# Patient Record
Sex: MALE | Race: WHITE | NOT HISPANIC OR LATINO | Employment: FULL TIME | URBAN - METROPOLITAN AREA
[De-identification: names, ages, dates, MRNs, and addresses within clinical notes are randomized per-mention and may not be internally consistent; named-entity substitution may affect disease eponyms.]

---

## 2020-09-08 DIAGNOSIS — K21.9 GASTROESOPHAGEAL REFLUX DISEASE WITHOUT ESOPHAGITIS: Primary | ICD-10-CM

## 2020-09-08 RX ORDER — OMEPRAZOLE 40 MG/1
40 CAPSULE, DELAYED RELEASE ORAL DAILY
Qty: 90 CAPSULE | Refills: 2 | OUTPATIENT
Start: 2020-09-08 | End: 2020-12-07

## 2020-09-11 ENCOUNTER — OFFICE VISIT (OUTPATIENT)
Dept: INTERNAL MEDICINE CLINIC | Facility: CLINIC | Age: 55
End: 2020-09-11
Payer: COMMERCIAL

## 2020-09-11 VITALS
HEART RATE: 95 BPM | BODY MASS INDEX: 26.34 KG/M2 | DIASTOLIC BLOOD PRESSURE: 86 MMHG | SYSTOLIC BLOOD PRESSURE: 138 MMHG | WEIGHT: 184 LBS | TEMPERATURE: 97.4 F | HEIGHT: 70 IN

## 2020-09-11 DIAGNOSIS — E78.1 HYPERTRIGLYCERIDEMIA: ICD-10-CM

## 2020-09-11 DIAGNOSIS — K21.9 GASTROESOPHAGEAL REFLUX DISEASE WITHOUT ESOPHAGITIS: ICD-10-CM

## 2020-09-11 DIAGNOSIS — C14.0 THROAT CANCER (HCC): ICD-10-CM

## 2020-09-11 DIAGNOSIS — R73.01 IMPAIRED FASTING GLUCOSE: Primary | ICD-10-CM

## 2020-09-11 PROCEDURE — 3725F SCREEN DEPRESSION PERFORMED: CPT | Performed by: INTERNAL MEDICINE

## 2020-09-11 PROCEDURE — 99214 OFFICE O/P EST MOD 30 MIN: CPT | Performed by: INTERNAL MEDICINE

## 2020-09-11 RX ORDER — OMEPRAZOLE 40 MG/1
40 CAPSULE, DELAYED RELEASE ORAL DAILY
Qty: 90 CAPSULE | Refills: 1 | Status: SHIPPED | OUTPATIENT
Start: 2020-09-11 | End: 2021-01-26 | Stop reason: SDUPTHER

## 2020-09-11 RX ORDER — OMEPRAZOLE 40 MG/1
40 CAPSULE, DELAYED RELEASE ORAL DAILY
COMMUNITY
End: 2020-09-11 | Stop reason: SDUPTHER

## 2020-09-11 NOTE — PROGRESS NOTES
Assessment/Plan:    BMI Counseling: Body mass index is 26 4 kg/m²  The BMI is above normal  Nutrition recommendations include decreasing portion sizes, encouraging healthy choices of fruits and vegetables and decreasing fast food intake  Exercise recommendations include moderate physical activity 150 minutes/week  Tobacco Cessation Counseling: Tobacco cessation counseling was provided  The patient is sincerely urged to quit consumption of tobacco  He is ready to quit tobacco            1  Impaired fasting glucose  -     CBC and differential; Future  -     Comprehensive metabolic panel; Future  -     TSH, 3rd generation; Future  -     Lipid Panel with Direct LDL reflex; Future  -     HEMOGLOBIN A1C W/ EAG ESTIMATION; Future    2  Gastroesophageal reflux disease without esophagitis  -     omeprazole (PriLOSEC) 40 MG capsule; Take 1 capsule (40 mg total) by mouth daily    3  Throat cancer ([facility] )    4  Hypertriglyceridemia  -     Comprehensive metabolic panel; Future  -     TSH, 3rd generation; Future  -     Lipid Panel with Direct LDL reflex; Future           Subjective:      Patient ID: Norine Nissen is a 60 y o  male  Follow-up multiple medical issue to ensure the stable      The following portions of the patient's history were reviewed and updated as appropriate: He  has a past medical history of GERD (gastroesophageal reflux disease), Hepatitis C, Hypertriglyceridemia, Impaired fasting glucose, and Throat cancer ([facility] )  He   Patient Active Problem List    Diagnosis Date Noted    Throat cancer ([facility] )     Impaired fasting glucose     Hypertriglyceridemia     GERD (gastroesophageal reflux disease)     Hepatitis C      He  has a past surgical history that includes Knee surgery and Replacement total knee (Right)  His family history includes Heart disease in his father  He  reports that he has been smoking cigarettes  He has a 17 50 pack-year smoking history   He has never used smokeless tobacco  He reports current alcohol use of about 1 0 standard drinks of alcohol per week  He reports that he does not use drugs  Current Outpatient Medications   Medication Sig Dispense Refill    omeprazole (PriLOSEC) 40 MG capsule Take 1 capsule (40 mg total) by mouth daily 90 capsule 1     No current facility-administered medications for this visit  Current Outpatient Medications on File Prior to Visit   Medication Sig    [DISCONTINUED] omeprazole (PriLOSEC) 40 MG capsule Take 40 mg by mouth daily     No current facility-administered medications on file prior to visit  He is allergic to penicillins       Review of Systems   Constitutional: Negative for chills and fever  HENT: Negative for congestion, ear pain and sore throat  Eyes: Negative for pain  Respiratory: Negative for cough and shortness of breath  Cardiovascular: Negative for chest pain and leg swelling  Gastrointestinal: Negative for abdominal pain, nausea and vomiting  Endocrine: Negative for polyuria  Genitourinary: Negative for difficulty urinating, frequency and urgency  Musculoskeletal: Negative for arthralgias and back pain  Skin: Negative for rash  Neurological: Negative for weakness and headaches  Psychiatric/Behavioral: Negative for sleep disturbance  The patient is not nervous/anxious  Objective:      /86 (BP Location: Right arm, Patient Position: Sitting)   Pulse 95   Temp (!) 97 4 °F (36 3 °C) (Skin)   Ht 5' 10" (1 778 m)   Wt 83 5 kg (184 lb)   BMI 26 40 kg/m²     No results found for this or any previous visit (from the past 1344 hour(s))  Physical Exam  Constitutional:       Appearance: Normal appearance  HENT:      Head: Normocephalic  Right Ear: Tympanic membrane, ear canal and external ear normal       Left Ear: Tympanic membrane, ear canal and external ear normal       Nose: Nose normal  No congestion        Mouth/Throat:      Mouth: Mucous membranes are moist       Pharynx: Oropharynx is clear  No oropharyngeal exudate or posterior oropharyngeal erythema  Eyes:      Extraocular Movements: Extraocular movements intact  Conjunctiva/sclera: Conjunctivae normal       Pupils: Pupils are equal, round, and reactive to light  Neck:      Musculoskeletal: Normal range of motion and neck supple  Cardiovascular:      Rate and Rhythm: Normal rate and regular rhythm  Heart sounds: Normal heart sounds  No murmur  Pulmonary:      Effort: Pulmonary effort is normal       Breath sounds: Normal breath sounds  No wheezing or rales  Abdominal:      General: Bowel sounds are normal  There is no distension  Palpations: Abdomen is soft  Tenderness: There is no abdominal tenderness  Musculoskeletal: Normal range of motion  Right lower leg: No edema  Left lower leg: No edema  Lymphadenopathy:      Cervical: No cervical adenopathy  Skin:     General: Skin is warm  Neurological:      General: No focal deficit present  Mental Status: He is alert and oriented to person, place, and time

## 2020-09-16 ENCOUNTER — APPOINTMENT (OUTPATIENT)
Dept: LAB | Facility: CLINIC | Age: 55
End: 2020-09-16
Payer: COMMERCIAL

## 2020-09-16 DIAGNOSIS — E78.1 HYPERTRIGLYCERIDEMIA: ICD-10-CM

## 2020-09-16 DIAGNOSIS — R73.01 IMPAIRED FASTING GLUCOSE: ICD-10-CM

## 2020-09-16 LAB
ALBUMIN SERPL BCP-MCNC: 4.1 G/DL (ref 3.5–5)
ALP SERPL-CCNC: 73 U/L (ref 46–116)
ALT SERPL W P-5'-P-CCNC: 34 U/L (ref 12–78)
ANION GAP SERPL CALCULATED.3IONS-SCNC: 3 MMOL/L (ref 4–13)
AST SERPL W P-5'-P-CCNC: 29 U/L (ref 5–45)
BASOPHILS # BLD AUTO: 0.06 THOUSANDS/ΜL (ref 0–0.1)
BASOPHILS NFR BLD AUTO: 1 % (ref 0–1)
BILIRUB SERPL-MCNC: 0.56 MG/DL (ref 0.2–1)
BUN SERPL-MCNC: 18 MG/DL (ref 5–25)
CALCIUM SERPL-MCNC: 9.5 MG/DL (ref 8.3–10.1)
CHLORIDE SERPL-SCNC: 106 MMOL/L (ref 100–108)
CHOLEST SERPL-MCNC: 212 MG/DL (ref 50–200)
CO2 SERPL-SCNC: 30 MMOL/L (ref 21–32)
CREAT SERPL-MCNC: 0.95 MG/DL (ref 0.6–1.3)
EOSINOPHIL # BLD AUTO: 0.14 THOUSAND/ΜL (ref 0–0.61)
EOSINOPHIL NFR BLD AUTO: 2 % (ref 0–6)
ERYTHROCYTE [DISTWIDTH] IN BLOOD BY AUTOMATED COUNT: 11.9 % (ref 11.6–15.1)
EST. AVERAGE GLUCOSE BLD GHB EST-MCNC: 126 MG/DL
GFR SERPL CREATININE-BSD FRML MDRD: 88 ML/MIN/1.73SQ M
GLUCOSE P FAST SERPL-MCNC: 117 MG/DL (ref 65–99)
HBA1C MFR BLD: 6 %
HCT VFR BLD AUTO: 45 % (ref 36.5–49.3)
HDLC SERPL-MCNC: 33 MG/DL
HGB BLD-MCNC: 16 G/DL (ref 12–17)
IMM GRANULOCYTES # BLD AUTO: 0.01 THOUSAND/UL (ref 0–0.2)
IMM GRANULOCYTES NFR BLD AUTO: 0 % (ref 0–2)
LDLC SERPL CALC-MCNC: 132 MG/DL (ref 0–100)
LYMPHOCYTES # BLD AUTO: 1.58 THOUSANDS/ΜL (ref 0.6–4.47)
LYMPHOCYTES NFR BLD AUTO: 23 % (ref 14–44)
MCH RBC QN AUTO: 33.1 PG (ref 26.8–34.3)
MCHC RBC AUTO-ENTMCNC: 35.6 G/DL (ref 31.4–37.4)
MCV RBC AUTO: 93 FL (ref 82–98)
MONOCYTES # BLD AUTO: 0.73 THOUSAND/ΜL (ref 0.17–1.22)
MONOCYTES NFR BLD AUTO: 11 % (ref 4–12)
NEUTROPHILS # BLD AUTO: 4.42 THOUSANDS/ΜL (ref 1.85–7.62)
NEUTS SEG NFR BLD AUTO: 63 % (ref 43–75)
NRBC BLD AUTO-RTO: 0 /100 WBCS
PLATELET # BLD AUTO: 150 THOUSANDS/UL (ref 149–390)
PMV BLD AUTO: 11.8 FL (ref 8.9–12.7)
POTASSIUM SERPL-SCNC: 3.6 MMOL/L (ref 3.5–5.3)
PROT SERPL-MCNC: 7.1 G/DL (ref 6.4–8.2)
RBC # BLD AUTO: 4.83 MILLION/UL (ref 3.88–5.62)
SODIUM SERPL-SCNC: 139 MMOL/L (ref 136–145)
TRIGL SERPL-MCNC: 234 MG/DL
TSH SERPL DL<=0.05 MIU/L-ACNC: 12.7 UIU/ML (ref 0.36–3.74)
WBC # BLD AUTO: 6.94 THOUSAND/UL (ref 4.31–10.16)

## 2020-09-16 PROCEDURE — 85025 COMPLETE CBC W/AUTO DIFF WBC: CPT

## 2020-09-16 PROCEDURE — 84443 ASSAY THYROID STIM HORMONE: CPT

## 2020-09-16 PROCEDURE — 80053 COMPREHEN METABOLIC PANEL: CPT

## 2020-09-16 PROCEDURE — 83036 HEMOGLOBIN GLYCOSYLATED A1C: CPT

## 2020-09-16 PROCEDURE — 80061 LIPID PANEL: CPT

## 2020-09-16 PROCEDURE — 36415 COLL VENOUS BLD VENIPUNCTURE: CPT

## 2020-09-21 ENCOUNTER — OFFICE VISIT (OUTPATIENT)
Dept: INTERNAL MEDICINE CLINIC | Facility: CLINIC | Age: 55
End: 2020-09-21
Payer: COMMERCIAL

## 2020-09-21 VITALS
HEIGHT: 70 IN | HEART RATE: 62 BPM | TEMPERATURE: 97.3 F | BODY MASS INDEX: 26.48 KG/M2 | SYSTOLIC BLOOD PRESSURE: 133 MMHG | WEIGHT: 185 LBS | DIASTOLIC BLOOD PRESSURE: 86 MMHG

## 2020-09-21 DIAGNOSIS — R73.01 IMPAIRED FASTING GLUCOSE: ICD-10-CM

## 2020-09-21 DIAGNOSIS — C14.0 THROAT CANCER (HCC): ICD-10-CM

## 2020-09-21 DIAGNOSIS — E03.9 ACQUIRED HYPOTHYROIDISM: Primary | ICD-10-CM

## 2020-09-21 DIAGNOSIS — B18.2 CHRONIC HEPATITIS C WITHOUT HEPATIC COMA (HCC): ICD-10-CM

## 2020-09-21 DIAGNOSIS — E78.1 HYPERTRIGLYCERIDEMIA: ICD-10-CM

## 2020-09-21 DIAGNOSIS — K21.9 GASTROESOPHAGEAL REFLUX DISEASE WITHOUT ESOPHAGITIS: ICD-10-CM

## 2020-09-21 PROCEDURE — 99214 OFFICE O/P EST MOD 30 MIN: CPT | Performed by: INTERNAL MEDICINE

## 2020-09-21 PROCEDURE — 4004F PT TOBACCO SCREEN RCVD TLK: CPT | Performed by: INTERNAL MEDICINE

## 2020-09-21 RX ORDER — LEVOTHYROXINE SODIUM 0.05 MG/1
50 TABLET ORAL
Qty: 90 TABLET | Refills: 0 | Status: SHIPPED | OUTPATIENT
Start: 2020-09-21 | End: 2021-01-26

## 2020-09-21 NOTE — PROGRESS NOTES
Assessment/Plan:      Tobacco Cessation Counseling: Tobacco cessation counseling was provided  The patient is sincerely urged to quit consumption of tobacco  He is ready to quit tobacco            1  Acquired hypothyroidism  -     levothyroxine 50 mcg tablet; Take 1 tablet (50 mcg total) by mouth daily in the early morning  -     TSH, 3rd generation; Future    2  Impaired fasting glucose    3  Throat cancer ([facility] )    4  Hypertriglyceridemia    5  Gastroesophageal reflux disease without esophagitis    6  Chronic hepatitis C without hepatic coma (HCC)           Subjective:      Patient ID: Earla Cogan is a 60 y o  male  Follow-up blood test done on 09/16/2020-discussed with him      The following portions of the patient's history were reviewed and updated as appropriate: He  has a past medical history of GERD (gastroesophageal reflux disease), Hepatitis C, Hypertriglyceridemia, Impaired fasting glucose, and Throat cancer ([facility] )  He   Patient Active Problem List    Diagnosis Date Noted    Throat cancer ([place] [address] )     Impaired fasting glucose     Hypertriglyceridemia     GERD (gastroesophageal reflux disease)     Hepatitis C      He  has a past surgical history that includes Knee surgery and Replacement total knee (Right)  His family history includes Heart disease in his father  He  reports that he has been smoking cigarettes  He has a 17 50 pack-year smoking history  He has never used smokeless tobacco  He reports current alcohol use of about 1 0 standard drinks of alcohol per week  He reports that he does not use drugs  Current Outpatient Medications   Medication Sig Dispense Refill    omeprazole (PriLOSEC) 40 MG capsule Take 1 capsule (40 mg total) by mouth daily 90 capsule 1    levothyroxine 50 mcg tablet Take 1 tablet (50 mcg total) by mouth daily in the early morning 90 tablet 0     No current facility-administered medications for this visit        Current Outpatient Medications on File Prior to Visit Medication Sig    omeprazole (PriLOSEC) 40 MG capsule Take 1 capsule (40 mg total) by mouth daily     No current facility-administered medications on file prior to visit  He is allergic to penicillins       Review of Systems   Constitutional: Negative for chills and fever  HENT: Negative for congestion, ear pain and sore throat  Eyes: Negative for pain  Respiratory: Negative for cough and shortness of breath  Cardiovascular: Negative for chest pain and leg swelling  Gastrointestinal: Negative for abdominal pain, nausea and vomiting  Endocrine: Negative for polyuria  Genitourinary: Negative for difficulty urinating, frequency and urgency  Musculoskeletal: Negative for arthralgias and back pain  Skin: Negative for rash  Neurological: Negative for weakness and headaches  Psychiatric/Behavioral: Negative for sleep disturbance  The patient is not nervous/anxious            Objective:      /86 (BP Location: Left arm, Patient Position: Sitting, Cuff Size: Adult)   Pulse 62   Temp (!) 97 3 °F (36 3 °C) (Temporal)   Ht 5' 10" (1 778 m)   Wt 83 9 kg (185 lb)   BMI 26 54 kg/m²     Recent Results (from the past 1344 hour(s))   CBC and differential    Collection Time: 09/16/20  8:08 AM   Result Value Ref Range    WBC 6 94 4 31 - 10 16 Thousand/uL    RBC 4 83 3 88 - 5 62 Million/uL    Hemoglobin 16 0 12 0 - 17 0 g/dL    Hematocrit 45 0 36 5 - 49 3 %    MCV 93 82 - 98 fL    MCH 33 1 26 8 - 34 3 pg    MCHC 35 6 31 4 - 37 4 g/dL    RDW 11 9 11 6 - 15 1 %    MPV 11 8 8 9 - 12 7 fL    Platelets 253 790 - 210 Thousands/uL    nRBC 0 /100 WBCs    Neutrophils Relative 63 43 - 75 %    Immat GRANS % 0 0 - 2 %    Lymphocytes Relative 23 14 - 44 %    Monocytes Relative 11 4 - 12 %    Eosinophils Relative 2 0 - 6 %    Basophils Relative 1 0 - 1 %    Neutrophils Absolute 4 42 1 85 - 7 62 Thousands/µL    Immature Grans Absolute 0 01 0 00 - 0 20 Thousand/uL    Lymphocytes Absolute 1 58 0 60 - 4 47 Thousands/µL    Monocytes Absolute 0 73 0 17 - 1 22 Thousand/µL    Eosinophils Absolute 0 14 0 00 - 0 61 Thousand/µL    Basophils Absolute 0 06 0 00 - 0 10 Thousands/µL   Comprehensive metabolic panel    Collection Time: 09/16/20  8:08 AM   Result Value Ref Range    Sodium 139 136 - 145 mmol/L    Potassium 3 6 3 5 - 5 3 mmol/L    Chloride 106 100 - 108 mmol/L    CO2 30 21 - 32 mmol/L    ANION GAP 3 (L) 4 - 13 mmol/L    BUN 18 5 - 25 mg/dL    Creatinine 0 95 0 60 - 1 30 mg/dL    Glucose, Fasting 117 (H) 65 - 99 mg/dL    Calcium 9 5 8 3 - 10 1 mg/dL    AST 29 5 - 45 U/L    ALT 34 12 - 78 U/L    Alkaline Phosphatase 73 46 - 116 U/L    Total Protein 7 1 6 4 - 8 2 g/dL    Albumin 4 1 3 5 - 5 0 g/dL    Total Bilirubin 0 56 0 20 - 1 00 mg/dL    eGFR 88 ml/min/1 73sq m   TSH, 3rd generation    Collection Time: 09/16/20  8:08 AM   Result Value Ref Range    TSH 3RD GENERATON 12 700 (H) 0 358 - 3 740 uIU/mL   Lipid Panel with Direct LDL reflex    Collection Time: 09/16/20  8:08 AM   Result Value Ref Range    Cholesterol 212 (H) 50 - 200 mg/dL    Triglycerides 234 (H) <=150 mg/dL    HDL, Direct 33 (L) >=40 mg/dL    LDL Calculated 132 (H) 0 - 100 mg/dL   HEMOGLOBIN A1C W/ EAG ESTIMATION    Collection Time: 09/16/20  8:08 AM   Result Value Ref Range    Hemoglobin A1C 6 0 (H) Normal 3 8-5 6%; PreDiabetic 5 7-6 4%; Diabetic >=6 5%; Glycemic control for adults with diabetes <7 0% %     mg/dl        Physical Exam  Constitutional:       Appearance: Normal appearance  HENT:      Head: Normocephalic  Right Ear: Tympanic membrane, ear canal and external ear normal       Left Ear: Tympanic membrane, ear canal and external ear normal       Nose: Nose normal  No congestion  Mouth/Throat:      Mouth: Mucous membranes are moist       Pharynx: Oropharynx is clear  No oropharyngeal exudate or posterior oropharyngeal erythema  Eyes:      Extraocular Movements: Extraocular movements intact        Conjunctiva/sclera: Conjunctivae normal       Pupils: Pupils are equal, round, and reactive to light  Neck:      Musculoskeletal: Normal range of motion and neck supple  Cardiovascular:      Rate and Rhythm: Normal rate and regular rhythm  Heart sounds: Normal heart sounds  No murmur  Pulmonary:      Effort: Pulmonary effort is normal       Breath sounds: Normal breath sounds  No wheezing or rales  Abdominal:      General: Bowel sounds are normal  There is no distension  Palpations: Abdomen is soft  Tenderness: There is no abdominal tenderness  Musculoskeletal: Normal range of motion  Right lower leg: No edema  Left lower leg: No edema  Lymphadenopathy:      Cervical: No cervical adenopathy  Skin:     General: Skin is warm  Neurological:      General: No focal deficit present  Mental Status: He is alert and oriented to person, place, and time

## 2020-11-27 ENCOUNTER — APPOINTMENT (OUTPATIENT)
Dept: LAB | Facility: CLINIC | Age: 55
End: 2020-11-27
Payer: COMMERCIAL

## 2020-11-27 DIAGNOSIS — E03.9 ACQUIRED HYPOTHYROIDISM: ICD-10-CM

## 2020-11-27 LAB — TSH SERPL DL<=0.05 MIU/L-ACNC: 12.7 UIU/ML (ref 0.36–3.74)

## 2020-11-27 PROCEDURE — 36415 COLL VENOUS BLD VENIPUNCTURE: CPT

## 2020-11-27 PROCEDURE — 84443 ASSAY THYROID STIM HORMONE: CPT

## 2021-01-26 ENCOUNTER — OFFICE VISIT (OUTPATIENT)
Dept: INTERNAL MEDICINE CLINIC | Facility: CLINIC | Age: 56
End: 2021-01-26
Payer: COMMERCIAL

## 2021-01-26 VITALS
HEIGHT: 70 IN | DIASTOLIC BLOOD PRESSURE: 82 MMHG | OXYGEN SATURATION: 98 % | TEMPERATURE: 97.5 F | SYSTOLIC BLOOD PRESSURE: 132 MMHG | HEART RATE: 87 BPM | WEIGHT: 185 LBS | BODY MASS INDEX: 26.48 KG/M2

## 2021-01-26 DIAGNOSIS — K21.9 GASTROESOPHAGEAL REFLUX DISEASE WITHOUT ESOPHAGITIS: ICD-10-CM

## 2021-01-26 DIAGNOSIS — R53.83 OTHER FATIGUE: ICD-10-CM

## 2021-01-26 DIAGNOSIS — C14.0 THROAT CANCER (HCC): ICD-10-CM

## 2021-01-26 DIAGNOSIS — R73.01 IMPAIRED FASTING GLUCOSE: ICD-10-CM

## 2021-01-26 DIAGNOSIS — E78.2 MIXED HYPERLIPIDEMIA: ICD-10-CM

## 2021-01-26 DIAGNOSIS — E03.9 ACQUIRED HYPOTHYROIDISM: Primary | ICD-10-CM

## 2021-01-26 PROCEDURE — 4004F PT TOBACCO SCREEN RCVD TLK: CPT | Performed by: INTERNAL MEDICINE

## 2021-01-26 PROCEDURE — 99214 OFFICE O/P EST MOD 30 MIN: CPT | Performed by: INTERNAL MEDICINE

## 2021-01-26 PROCEDURE — 3725F SCREEN DEPRESSION PERFORMED: CPT | Performed by: INTERNAL MEDICINE

## 2021-01-26 PROCEDURE — 3008F BODY MASS INDEX DOCD: CPT | Performed by: INTERNAL MEDICINE

## 2021-01-26 RX ORDER — LEVOTHYROXINE SODIUM 0.07 MG/1
75 TABLET ORAL
Qty: 90 TABLET | Refills: 0 | Status: SHIPPED | OUTPATIENT
Start: 2021-01-26 | End: 2021-04-09

## 2021-01-26 RX ORDER — OMEPRAZOLE 40 MG/1
40 CAPSULE, DELAYED RELEASE ORAL DAILY
Qty: 90 CAPSULE | Refills: 1 | Status: SHIPPED | OUTPATIENT
Start: 2021-01-26 | End: 2021-07-09

## 2021-01-26 NOTE — PROGRESS NOTES
Assessment/Plan:    BMI Counseling: Body mass index is 26 54 kg/m²  The BMI is above normal  Nutrition recommendations include decreasing portion sizes, encouraging healthy choices of fruits and vegetables and decreasing fast food intake  Exercise recommendations include moderate physical activity 150 minutes/week  Tobacco Cessation Counseling: Tobacco cessation counseling was provided  The patient is sincerely urged to quit consumption of tobacco  He is ready to quit tobacco            1  Acquired hypothyroidism  -     levothyroxine 75 mcg tablet; Take 1 tablet (75 mcg total) by mouth daily in the early morning  -     CBC and differential; Future  -     TSH, 3rd generation; Future    2  Gastroesophageal reflux disease without esophagitis  -     omeprazole (PriLOSEC) 40 MG capsule; Take 1 capsule (40 mg total) by mouth daily    3  Throat cancer ([facility] )    4  Impaired fasting glucose  -     CBC and differential; Future  -     Comprehensive metabolic panel; Future  -     Hemoglobin A1C; Future  -     Lipid Panel with Direct LDL reflex; Future    5  Mixed hyperlipidemia  -     Comprehensive metabolic panel; Future  -     Lipid Panel with Direct LDL reflex; Future    6  Other fatigue  -     Lyme Antibody Profile with reflex to WB; Future           Subjective:      Patient ID: Heron Mathis is a 60 y o  male  Follow-up on multiple medical problems to ensure they are stable on current medications      The following portions of the patient's history were reviewed and updated as appropriate: He  has a past medical history of GERD (gastroesophageal reflux disease), Hepatitis C, Hypertriglyceridemia, Impaired fasting glucose, and Throat cancer ([facility] )    He   Patient Active Problem List    Diagnosis Date Noted    Acquired hypothyroidism 01/26/2021    Mixed hyperlipidemia 01/26/2021    Other fatigue 01/26/2021    Throat cancer (HCC)     Impaired fasting glucose     Hypertriglyceridemia     GERD (gastroesophageal reflux disease)     Hepatitis C      He  has a past surgical history that includes Knee surgery; Replacement total knee (Right); PEG tube placement; PEG tube removal; and TRACHEOSTOMY  His family history includes Heart disease in his father  He  reports that he has been smoking cigarettes  He has a 8 75 pack-year smoking history  He has never used smokeless tobacco  He reports current alcohol use of about 1 0 standard drinks of alcohol per week  He reports that he does not use drugs  Current Outpatient Medications   Medication Sig Dispense Refill    omeprazole (PriLOSEC) 40 MG capsule Take 1 capsule (40 mg total) by mouth daily 90 capsule 1    levothyroxine 75 mcg tablet Take 1 tablet (75 mcg total) by mouth daily in the early morning 90 tablet 0     No current facility-administered medications for this visit  Current Outpatient Medications on File Prior to Visit   Medication Sig    [DISCONTINUED] levothyroxine 50 mcg tablet Take 1 tablet (50 mcg total) by mouth daily in the early morning    [DISCONTINUED] omeprazole (PriLOSEC) 40 MG capsule Take 1 capsule (40 mg total) by mouth daily     No current facility-administered medications on file prior to visit  He is allergic to penicillins       Review of Systems   Constitutional: Negative for chills and fever  HENT: Negative for congestion, ear pain and sore throat  Eyes: Negative for pain  Respiratory: Negative for cough and shortness of breath  Cardiovascular: Negative for chest pain and leg swelling  Gastrointestinal: Negative for abdominal pain, nausea and vomiting  Endocrine: Negative for polyuria  Genitourinary: Negative for difficulty urinating, frequency and urgency  Musculoskeletal: Negative for arthralgias and back pain  Skin: Negative for rash  Neurological: Negative for weakness and headaches  Psychiatric/Behavioral: Negative for sleep disturbance  The patient is not nervous/anxious  Objective:      /82 (BP Location: Left arm, Patient Position: Sitting, Cuff Size: Standard)   Pulse 87   Temp 97 5 °F (36 4 °C) (Temporal)   Ht 5' 10" (1 778 m)   Wt 83 9 kg (185 lb)   SpO2 98%   BMI 26 54 kg/m²     No results found for this or any previous visit (from the past 1344 hour(s))  Physical Exam  Constitutional:       Appearance: Normal appearance  HENT:      Head: Normocephalic  Right Ear: Tympanic membrane, ear canal and external ear normal       Left Ear: Tympanic membrane, ear canal and external ear normal       Nose: Nose normal  No congestion  Mouth/Throat:      Mouth: Mucous membranes are moist       Pharynx: Oropharynx is clear  No oropharyngeal exudate or posterior oropharyngeal erythema  Eyes:      Extraocular Movements: Extraocular movements intact  Conjunctiva/sclera: Conjunctivae normal       Pupils: Pupils are equal, round, and reactive to light  Neck:      Musculoskeletal: Normal range of motion and neck supple  Cardiovascular:      Rate and Rhythm: Normal rate and regular rhythm  Heart sounds: Normal heart sounds  No murmur  Pulmonary:      Effort: Pulmonary effort is normal       Breath sounds: Normal breath sounds  No wheezing or rales  Abdominal:      General: Bowel sounds are normal  There is no distension  Palpations: Abdomen is soft  Tenderness: There is no abdominal tenderness  Musculoskeletal: Normal range of motion  Right lower leg: No edema  Left lower leg: No edema  Lymphadenopathy:      Cervical: No cervical adenopathy  Skin:     General: Skin is warm  Neurological:      General: No focal deficit present  Mental Status: He is alert and oriented to person, place, and time

## 2021-02-09 ENCOUNTER — LAB (OUTPATIENT)
Dept: LAB | Facility: CLINIC | Age: 56
End: 2021-02-09
Payer: COMMERCIAL

## 2021-02-09 DIAGNOSIS — R53.83 OTHER FATIGUE: ICD-10-CM

## 2021-02-09 PROCEDURE — 36415 COLL VENOUS BLD VENIPUNCTURE: CPT

## 2021-02-09 PROCEDURE — 86618 LYME DISEASE ANTIBODY: CPT

## 2021-02-11 LAB — B BURGDOR IGG+IGM SER-ACNC: 41

## 2021-03-04 ENCOUNTER — LAB (OUTPATIENT)
Dept: LAB | Facility: CLINIC | Age: 56
End: 2021-03-04
Payer: COMMERCIAL

## 2021-03-04 DIAGNOSIS — R73.01 IMPAIRED FASTING GLUCOSE: ICD-10-CM

## 2021-03-04 DIAGNOSIS — E03.9 ACQUIRED HYPOTHYROIDISM: ICD-10-CM

## 2021-03-04 DIAGNOSIS — E78.2 MIXED HYPERLIPIDEMIA: ICD-10-CM

## 2021-03-04 LAB
ALBUMIN SERPL BCP-MCNC: 4.2 G/DL (ref 3.5–5)
ALP SERPL-CCNC: 85 U/L (ref 46–116)
ALT SERPL W P-5'-P-CCNC: 31 U/L (ref 12–78)
ANION GAP SERPL CALCULATED.3IONS-SCNC: 5 MMOL/L (ref 4–13)
AST SERPL W P-5'-P-CCNC: 10 U/L (ref 5–45)
BASOPHILS # BLD AUTO: 0.05 THOUSANDS/ΜL (ref 0–0.1)
BASOPHILS NFR BLD AUTO: 1 % (ref 0–1)
BILIRUB SERPL-MCNC: 0.48 MG/DL (ref 0.2–1)
BUN SERPL-MCNC: 18 MG/DL (ref 5–25)
CALCIUM SERPL-MCNC: 9.4 MG/DL (ref 8.3–10.1)
CHLORIDE SERPL-SCNC: 108 MMOL/L (ref 100–108)
CHOLEST SERPL-MCNC: 215 MG/DL (ref 50–200)
CO2 SERPL-SCNC: 30 MMOL/L (ref 21–32)
CREAT SERPL-MCNC: 1.03 MG/DL (ref 0.6–1.3)
EOSINOPHIL # BLD AUTO: 0.14 THOUSAND/ΜL (ref 0–0.61)
EOSINOPHIL NFR BLD AUTO: 2 % (ref 0–6)
ERYTHROCYTE [DISTWIDTH] IN BLOOD BY AUTOMATED COUNT: 11.9 % (ref 11.6–15.1)
EST. AVERAGE GLUCOSE BLD GHB EST-MCNC: 123 MG/DL
GFR SERPL CREATININE-BSD FRML MDRD: 80 ML/MIN/1.73SQ M
GLUCOSE P FAST SERPL-MCNC: 117 MG/DL (ref 65–99)
HBA1C MFR BLD: 5.9 %
HCT VFR BLD AUTO: 45.9 % (ref 36.5–49.3)
HDLC SERPL-MCNC: 31 MG/DL
HGB BLD-MCNC: 16.4 G/DL (ref 12–17)
IMM GRANULOCYTES # BLD AUTO: 0.02 THOUSAND/UL (ref 0–0.2)
IMM GRANULOCYTES NFR BLD AUTO: 0 % (ref 0–2)
LDLC SERPL CALC-MCNC: 120 MG/DL (ref 0–100)
LYMPHOCYTES # BLD AUTO: 1.43 THOUSANDS/ΜL (ref 0.6–4.47)
LYMPHOCYTES NFR BLD AUTO: 16 % (ref 14–44)
MCH RBC QN AUTO: 32.9 PG (ref 26.8–34.3)
MCHC RBC AUTO-ENTMCNC: 35.7 G/DL (ref 31.4–37.4)
MCV RBC AUTO: 92 FL (ref 82–98)
MONOCYTES # BLD AUTO: 0.69 THOUSAND/ΜL (ref 0.17–1.22)
MONOCYTES NFR BLD AUTO: 8 % (ref 4–12)
NEUTROPHILS # BLD AUTO: 6.63 THOUSANDS/ΜL (ref 1.85–7.62)
NEUTS SEG NFR BLD AUTO: 73 % (ref 43–75)
NRBC BLD AUTO-RTO: 0 /100 WBCS
PLATELET # BLD AUTO: 173 THOUSANDS/UL (ref 149–390)
PMV BLD AUTO: 11.3 FL (ref 8.9–12.7)
POTASSIUM SERPL-SCNC: 3.8 MMOL/L (ref 3.5–5.3)
PROT SERPL-MCNC: 6.9 G/DL (ref 6.4–8.2)
RBC # BLD AUTO: 4.99 MILLION/UL (ref 3.88–5.62)
SODIUM SERPL-SCNC: 143 MMOL/L (ref 136–145)
TRIGL SERPL-MCNC: 322 MG/DL
TSH SERPL DL<=0.05 MIU/L-ACNC: 8.55 UIU/ML (ref 0.36–3.74)
WBC # BLD AUTO: 8.96 THOUSAND/UL (ref 4.31–10.16)

## 2021-03-04 PROCEDURE — 80053 COMPREHEN METABOLIC PANEL: CPT

## 2021-03-04 PROCEDURE — 85025 COMPLETE CBC W/AUTO DIFF WBC: CPT

## 2021-03-04 PROCEDURE — 84443 ASSAY THYROID STIM HORMONE: CPT

## 2021-03-04 PROCEDURE — 83036 HEMOGLOBIN GLYCOSYLATED A1C: CPT

## 2021-03-04 PROCEDURE — 36415 COLL VENOUS BLD VENIPUNCTURE: CPT

## 2021-03-04 PROCEDURE — 80061 LIPID PANEL: CPT

## 2021-03-17 ENCOUNTER — OFFICE VISIT (OUTPATIENT)
Dept: INTERNAL MEDICINE CLINIC | Facility: CLINIC | Age: 56
End: 2021-03-17
Payer: COMMERCIAL

## 2021-03-17 VITALS
BODY MASS INDEX: 26.34 KG/M2 | SYSTOLIC BLOOD PRESSURE: 136 MMHG | WEIGHT: 184 LBS | TEMPERATURE: 97.9 F | OXYGEN SATURATION: 98 % | HEIGHT: 70 IN | DIASTOLIC BLOOD PRESSURE: 82 MMHG | HEART RATE: 76 BPM

## 2021-03-17 DIAGNOSIS — E03.9 ACQUIRED HYPOTHYROIDISM: Primary | ICD-10-CM

## 2021-03-17 DIAGNOSIS — R73.01 IMPAIRED FASTING GLUCOSE: ICD-10-CM

## 2021-03-17 DIAGNOSIS — E78.2 MIXED HYPERLIPIDEMIA: ICD-10-CM

## 2021-03-17 DIAGNOSIS — Z12.11 ENCOUNTER FOR SCREENING FOR MALIGNANT NEOPLASM OF COLON: ICD-10-CM

## 2021-03-17 DIAGNOSIS — K21.9 GASTROESOPHAGEAL REFLUX DISEASE WITHOUT ESOPHAGITIS: ICD-10-CM

## 2021-03-17 DIAGNOSIS — C14.0 THROAT CANCER (HCC): ICD-10-CM

## 2021-03-17 DIAGNOSIS — Z72.0 TOBACCO USE: ICD-10-CM

## 2021-03-17 PROCEDURE — 3008F BODY MASS INDEX DOCD: CPT | Performed by: INTERNAL MEDICINE

## 2021-03-17 PROCEDURE — 4004F PT TOBACCO SCREEN RCVD TLK: CPT | Performed by: INTERNAL MEDICINE

## 2021-03-17 PROCEDURE — 99214 OFFICE O/P EST MOD 30 MIN: CPT | Performed by: INTERNAL MEDICINE

## 2021-03-17 PROCEDURE — 3725F SCREEN DEPRESSION PERFORMED: CPT | Performed by: INTERNAL MEDICINE

## 2021-03-17 NOTE — PROGRESS NOTES
Assessment/Plan:      Tobacco Cessation Counseling: Tobacco cessation counseling was provided  The patient is sincerely urged to quit consumption of tobacco  He is ready to quit tobacco            1  Acquired hypothyroidism  Comments:  Advised to repeat  TSH 3-4 weeks from now, further levothyroxine dosage based on the TSH level  Orders:  -     TSH, 3rd generation; Future    2  Impaired fasting glucose    3  Mixed hyperlipidemia    4  Gastroesophageal reflux disease without esophagitis    5  Throat cancer ([facility] [address] )    6  Encounter for screening for malignant neoplasm of colon  -     Cologuard; Future    7  Tobacco use  -     CT lung screening program; Future; Expected date: 03/17/2021           Subjective:      Patient ID: Alma Worthington is a 60 y o  male  Follow-up on blood test done on 03/04/2021 test discussed with him, was on new levothyroxine dosage for only 3-4 weeks while he went for blood tests      The following portions of the patient's history were reviewed and updated as appropriate: He  has a past medical history of GERD (gastroesophageal reflux disease), Hepatitis C, Hypertriglyceridemia, Impaired fasting glucose, and Throat cancer ([facility] [address] )  He   Patient Active Problem List    Diagnosis Date Noted    Encounter for screening for malignant neoplasm of colon 03/17/2021    Tobacco use 03/17/2021    Acquired hypothyroidism 01/26/2021    Mixed hyperlipidemia 01/26/2021    Other fatigue 01/26/2021    Throat cancer ([facility] [address] )     Impaired fasting glucose     Hypertriglyceridemia     GERD (gastroesophageal reflux disease)     Hepatitis C      He  has a past surgical history that includes Knee surgery; Replacement total knee (Right); PEG tube placement; PEG tube removal; and TRACHEOSTOMY  His family history includes Heart disease in his father  He  reports that he has been smoking cigarettes  He has a 8 75 pack-year smoking history   He has never used smokeless tobacco  He reports current alcohol use of about 1 0 standard drinks of alcohol per week  He reports that he does not use drugs  Current Outpatient Medications   Medication Sig Dispense Refill    levothyroxine 75 mcg tablet Take 1 tablet (75 mcg total) by mouth daily in the early morning 90 tablet 0    omeprazole (PriLOSEC) 40 MG capsule Take 1 capsule (40 mg total) by mouth daily 90 capsule 1     No current facility-administered medications for this visit  Current Outpatient Medications on File Prior to Visit   Medication Sig    levothyroxine 75 mcg tablet Take 1 tablet (75 mcg total) by mouth daily in the early morning    omeprazole (PriLOSEC) 40 MG capsule Take 1 capsule (40 mg total) by mouth daily     No current facility-administered medications on file prior to visit  He is allergic to penicillins       Review of Systems   Constitutional: Negative for chills and fever  HENT: Negative for congestion, ear pain and sore throat  Eyes: Negative for pain  Respiratory: Negative for cough and shortness of breath  Cardiovascular: Negative for chest pain and leg swelling  Gastrointestinal: Negative for abdominal pain, nausea and vomiting  Endocrine: Negative for polyuria  Genitourinary: Negative for difficulty urinating, frequency and urgency  Musculoskeletal: Negative for arthralgias and back pain  Skin: Negative for rash  Neurological: Negative for weakness and headaches  Psychiatric/Behavioral: Negative for sleep disturbance  The patient is not nervous/anxious            Objective:      /82 (BP Location: Left arm, Patient Position: Sitting, Cuff Size: Standard)   Pulse 76   Temp 97 9 °F (36 6 °C) (Temporal)   Ht 5' 10" (1 778 m)   Wt 83 5 kg (184 lb)   SpO2 98%   BMI 26 40 kg/m²     Recent Results (from the past 1344 hour(s))   Lyme Antibody Profile with reflex to WB    Collection Time: 02/09/21  7:52 AM   Result Value Ref Range    Lyme total antibody 41 0 00 - 119   CBC and differential    Collection Time: 03/04/21  7:26 AM   Result Value Ref Range    WBC 8 96 4 31 - 10 16 Thousand/uL    RBC 4 99 3 88 - 5 62 Million/uL    Hemoglobin 16 4 12 0 - 17 0 g/dL    Hematocrit 45 9 36 5 - 49 3 %    MCV 92 82 - 98 fL    MCH 32 9 26 8 - 34 3 pg    MCHC 35 7 31 4 - 37 4 g/dL    RDW 11 9 11 6 - 15 1 %    MPV 11 3 8 9 - 12 7 fL    Platelets 519 580 - 474 Thousands/uL    nRBC 0 /100 WBCs    Neutrophils Relative 73 43 - 75 %    Immat GRANS % 0 0 - 2 %    Lymphocytes Relative 16 14 - 44 %    Monocytes Relative 8 4 - 12 %    Eosinophils Relative 2 0 - 6 %    Basophils Relative 1 0 - 1 %    Neutrophils Absolute 6 63 1 85 - 7 62 Thousands/µL    Immature Grans Absolute 0 02 0 00 - 0 20 Thousand/uL    Lymphocytes Absolute 1 43 0 60 - 4 47 Thousands/µL    Monocytes Absolute 0 69 0 17 - 1 22 Thousand/µL    Eosinophils Absolute 0 14 0 00 - 0 61 Thousand/µL    Basophils Absolute 0 05 0 00 - 0 10 Thousands/µL   Comprehensive metabolic panel    Collection Time: 03/04/21  7:26 AM   Result Value Ref Range    Sodium 143 136 - 145 mmol/L    Potassium 3 8 3 5 - 5 3 mmol/L    Chloride 108 100 - 108 mmol/L    CO2 30 21 - 32 mmol/L    ANION GAP 5 4 - 13 mmol/L    BUN 18 5 - 25 mg/dL    Creatinine 1 03 0 60 - 1 30 mg/dL    Glucose, Fasting 117 (H) 65 - 99 mg/dL    Calcium 9 4 8 3 - 10 1 mg/dL    AST 10 5 - 45 U/L    ALT 31 12 - 78 U/L    Alkaline Phosphatase 85 46 - 116 U/L    Total Protein 6 9 6 4 - 8 2 g/dL    Albumin 4 2 3 5 - 5 0 g/dL    Total Bilirubin 0 48 0 20 - 1 00 mg/dL    eGFR 80 ml/min/1 73sq m   Hemoglobin A1C    Collection Time: 03/04/21  7:26 AM   Result Value Ref Range    Hemoglobin A1C 5 9 (H) Normal 3 8-5 6%; PreDiabetic 5 7-6 4%;  Diabetic >=6 5%; Glycemic control for adults with diabetes <7 0% %     mg/dl   Lipid Panel with Direct LDL reflex    Collection Time: 03/04/21  7:26 AM   Result Value Ref Range    Cholesterol 215 (H) 50 - 200 mg/dL    Triglycerides 322 (H) <=150 mg/dL    HDL, Direct 31 (L) >=40 mg/dL    LDL Calculated 120 (H) 0 - 100 mg/dL   TSH, 3rd generation    Collection Time: 03/04/21  7:26 AM   Result Value Ref Range    TSH 3RD GENERATON 8 550 (H) 0 358 - 3 740 uIU/mL        Physical Exam  Constitutional:       Appearance: Normal appearance  HENT:      Head: Normocephalic  Right Ear: Tympanic membrane, ear canal and external ear normal       Left Ear: Tympanic membrane, ear canal and external ear normal       Nose: Nose normal  No congestion  Mouth/Throat:      Mouth: Mucous membranes are moist       Pharynx: Oropharynx is clear  No oropharyngeal exudate or posterior oropharyngeal erythema  Eyes:      Extraocular Movements: Extraocular movements intact  Conjunctiva/sclera: Conjunctivae normal       Pupils: Pupils are equal, round, and reactive to light  Neck:      Musculoskeletal: Normal range of motion and neck supple  Cardiovascular:      Rate and Rhythm: Normal rate and regular rhythm  Heart sounds: Normal heart sounds  No murmur  Pulmonary:      Effort: Pulmonary effort is normal       Breath sounds: Normal breath sounds  No wheezing or rales  Abdominal:      General: Bowel sounds are normal  There is no distension  Palpations: Abdomen is soft  Tenderness: There is no abdominal tenderness  Musculoskeletal: Normal range of motion  Right lower leg: No edema  Left lower leg: No edema  Lymphadenopathy:      Cervical: No cervical adenopathy  Skin:     General: Skin is warm  Neurological:      General: No focal deficit present  Mental Status: He is alert and oriented to person, place, and time

## 2021-03-22 ENCOUNTER — IMMUNIZATIONS (OUTPATIENT)
Dept: FAMILY MEDICINE CLINIC | Facility: HOSPITAL | Age: 56
End: 2021-03-22

## 2021-03-22 DIAGNOSIS — Z23 ENCOUNTER FOR IMMUNIZATION: Primary | ICD-10-CM

## 2021-03-22 PROCEDURE — 91300 SARS-COV-2 / COVID-19 MRNA VACCINE (PFIZER-BIONTECH) 30 MCG: CPT

## 2021-03-22 PROCEDURE — 0001A SARS-COV-2 / COVID-19 MRNA VACCINE (PFIZER-BIONTECH) 30 MCG: CPT

## 2021-04-09 ENCOUNTER — APPOINTMENT (OUTPATIENT)
Dept: LAB | Facility: CLINIC | Age: 56
End: 2021-04-09
Payer: COMMERCIAL

## 2021-04-09 DIAGNOSIS — E03.9 ACQUIRED HYPOTHYROIDISM: Primary | ICD-10-CM

## 2021-04-09 DIAGNOSIS — E03.9 ACQUIRED HYPOTHYROIDISM: ICD-10-CM

## 2021-04-09 LAB — TSH SERPL DL<=0.05 MIU/L-ACNC: 6.34 UIU/ML (ref 0.36–3.74)

## 2021-04-09 PROCEDURE — 84443 ASSAY THYROID STIM HORMONE: CPT

## 2021-04-09 PROCEDURE — 36415 COLL VENOUS BLD VENIPUNCTURE: CPT

## 2021-04-09 RX ORDER — LEVOTHYROXINE SODIUM 88 UG/1
88 TABLET ORAL DAILY
Qty: 90 TABLET | Refills: 0 | Status: SHIPPED | OUTPATIENT
Start: 2021-04-09 | End: 2021-04-20

## 2021-04-14 ENCOUNTER — IMMUNIZATIONS (OUTPATIENT)
Dept: FAMILY MEDICINE CLINIC | Facility: HOSPITAL | Age: 56
End: 2021-04-14

## 2021-04-14 DIAGNOSIS — Z23 ENCOUNTER FOR IMMUNIZATION: Primary | ICD-10-CM

## 2021-04-14 PROCEDURE — 0002A SARS-COV-2 / COVID-19 MRNA VACCINE (PFIZER-BIONTECH) 30 MCG: CPT

## 2021-04-14 PROCEDURE — 91300 SARS-COV-2 / COVID-19 MRNA VACCINE (PFIZER-BIONTECH) 30 MCG: CPT

## 2021-04-20 ENCOUNTER — OFFICE VISIT (OUTPATIENT)
Dept: ENDOCRINOLOGY | Facility: CLINIC | Age: 56
End: 2021-04-20
Payer: COMMERCIAL

## 2021-04-20 VITALS
DIASTOLIC BLOOD PRESSURE: 80 MMHG | WEIGHT: 189.2 LBS | HEIGHT: 69 IN | BODY MASS INDEX: 28.02 KG/M2 | HEART RATE: 78 BPM | SYSTOLIC BLOOD PRESSURE: 150 MMHG

## 2021-04-20 DIAGNOSIS — E03.9 ACQUIRED HYPOTHYROIDISM: Primary | ICD-10-CM

## 2021-04-20 DIAGNOSIS — R73.01 IMPAIRED FASTING GLUCOSE: ICD-10-CM

## 2021-04-20 DIAGNOSIS — E78.2 MIXED HYPERLIPIDEMIA: ICD-10-CM

## 2021-04-20 DIAGNOSIS — E78.1 HYPERTRIGLYCERIDEMIA: ICD-10-CM

## 2021-04-20 PROCEDURE — 99244 OFF/OP CNSLTJ NEW/EST MOD 40: CPT | Performed by: INTERNAL MEDICINE

## 2021-04-20 PROCEDURE — 4004F PT TOBACCO SCREEN RCVD TLK: CPT | Performed by: INTERNAL MEDICINE

## 2021-04-20 PROCEDURE — 3008F BODY MASS INDEX DOCD: CPT | Performed by: INTERNAL MEDICINE

## 2021-04-20 RX ORDER — LEVOTHYROXINE SODIUM 88 UG/1
88 TABLET ORAL DAILY
Qty: 90 TABLET | Refills: 0
Start: 2021-04-20 | End: 2021-07-27 | Stop reason: ALTCHOICE

## 2021-04-20 RX ORDER — LEVOTHYROXINE SODIUM 0.07 MG/1
75 TABLET ORAL EVERY OTHER DAY
COMMUNITY
End: 2021-04-20 | Stop reason: ALTCHOICE

## 2021-04-20 NOTE — PROGRESS NOTES
Kerline Dangelo [facility] 60 y o  male MRN: [id number]    Encounter: [id number]    Assessment/Plan     Assessment: This is a 60 y o  male diagnosed with hypothyroidism  Plan:    Diagnoses and all orders for this visit:    Acquired hypothyroidism - due to hx of radiation in neck area  - continue 88 mcg daily, check labs in 6 weeks  -     levothyroxine 88 mcg tablet; Take 1 tablet (88 mcg total) by mouth daily  -     TSH, 3rd generation Lab Collect; Future  -     T4, free Lab Collect; Future    Mixed hyperlipidemia - will improve once thyroid labs normalize    Hypertriglyceridemia - advised diet and exercise    Impaired fasting glucose - advised to see nutritionist at [facility] free of charge  I will see patient back in my office in 3 months  CC:  hypothyrhoidism      History of Present Illness     HPI:  This is a 60 y o  male diagnosed with hypothyroidism in 1/2021  He was started on levothyroxine few months ago  He was previously taking 75 mcg of levothyroxine daily  This dose was increased by his PCP to 88 mcg daily alternating with 75 mcg daily  He started to use this regimen 5 days ago  He takes the medication in the correct way  History of external radiation to neck b/c of throat cancer in 2010  No recent iodine loading with medications, herbs, kelp supplements or radiological diagnostic studies  No difficulty with swallowing or breathing or change in voice  He sometimes feels that something is stuck in his throat  He has been having these symtpoms since throat surgery  He was told by his physician in the past that it is b/c of scar tissue in his neck  No hx thyroid nodules  Denies FH of thyroid cancers  ROS: denies any symptoms of hypothyroidism    Mother with thyroid disease  He smokes 5 cigarettes a day  He denies alcohol use  Patient also has HLP, hypertriglyceridemia and prediabetes  Review of Systems   Constitutional: Negative for fatigue and unexpected weight change     HENT: Negative for congestion, postnasal drip and sore throat  Eyes: Negative for pain and redness  Respiratory: Negative for cough, chest tightness, shortness of breath and wheezing  Cardiovascular: Negative for chest pain, palpitations and leg swelling  Gastrointestinal: Negative for abdominal pain, constipation, diarrhea, nausea and vomiting  Endocrine: Negative for polydipsia and polyuria  Genitourinary: Negative for dysuria  Musculoskeletal: Negative for arthralgias and back pain  Neurological: Negative for dizziness, light-headedness and headaches  Psychiatric/Behavioral: Negative for agitation  The patient is not nervous/anxious  All other systems reviewed and are negative        Historical Information   Past Medical History:   Diagnosis Date    GERD (gastroesophageal reflux disease)     Hepatitis C     Hypertriglyceridemia     Impaired fasting glucose     Throat cancer (HCC)      Past Surgical History:   Procedure Laterality Date    KNEE SURGERY      PEG TUBE PLACEMENT      PEG TUBE REMOVAL      REPLACEMENT TOTAL KNEE Right     TRACHEOSTOMY       Social History   Social History     Substance and Sexual Activity   Alcohol Use Yes    Alcohol/week: 1 0 standard drinks    Types: 1 Cans of beer per week    Frequency: Monthly or less    Drinks per session: 1 or 2    Binge frequency: Never     Social History     Substance and Sexual Activity   Drug Use Never     Social History     Tobacco Use   Smoking Status Current Every Day Smoker    Packs/day: 0 25    Years: 35 00    Pack years: 8 75    Types: Cigarettes   Smokeless Tobacco Never Used     Family History:   Family History   Problem Relation Age of Onset    Heart disease Father     Lung cancer Sister        Meds/Allergies   Current Outpatient Medications   Medication Sig Dispense Refill    levothyroxine 75 mcg tablet Take 75 mcg by mouth every other day (alternating with 88 mcg)      levothyroxine 88 mcg tablet Take 1 tablet (88 mcg total) by mouth daily (Patient taking differently: Take 88 mcg by mouth every other day (alternating with 75 mcg)) 90 tablet 0    omeprazole (PriLOSEC) 40 MG capsule Take 1 capsule (40 mg total) by mouth daily 90 capsule 1    Prevagen Extra Strength 20 MG CAPS Take 20 mg by mouth daily       No current facility-administered medications for this visit  Allergies   Allergen Reactions    Penicillins        Objective   Vitals: Blood pressure 150/80, pulse 78, height 5' 9" (1 753 m), weight 85 8 kg (189 lb 3 2 oz)  Physical Exam  Constitutional:       Appearance: He is well-developed  HENT:      Head: Normocephalic and atraumatic  Mouth/Throat:      Pharynx: No oropharyngeal exudate  Eyes:      Conjunctiva/sclera: Conjunctivae normal       Pupils: Pupils are equal, round, and reactive to light  Neck:      Musculoskeletal: Neck rigidity present  Comments: No palpable nodules  Cardiovascular:      Rate and Rhythm: Normal rate and regular rhythm  Heart sounds: No murmur  Pulmonary:      Effort: Pulmonary effort is normal  No respiratory distress  Breath sounds: Normal breath sounds  Abdominal:      General: There is no distension  Palpations: Abdomen is soft  Tenderness: There is no abdominal tenderness  Skin:     General: Skin is warm and dry  Findings: No erythema  Neurological:      Mental Status: He is alert and oriented to person, place, and time  Psychiatric:         Mood and Affect: Mood normal          The history was obtained from the review of the chart, patient  Lab Results:   Lab Results   Component Value Date/Time    TSH 3RD Amelie Valencia 6 340 (H) 04/09/2021 08:55 AM    TSH 3RD GENERATON 8 550 (H) 03/04/2021 07:26 AM    TSH 3RD GENERATON 12 700 (H) 11/27/2020 07:58 AM       Imaging Studies:        I have personally reviewed pertinent reports  Portions of the record may have been created with voice recognition software   Occasional wrong word or "sound a like" substitutions may have occurred due to the inherent limitations of voice recognition software  Read the chart carefully and recognize, using context, where substitutions have occurred

## 2021-05-06 ENCOUNTER — TELEPHONE (OUTPATIENT)
Dept: INTERNAL MEDICINE CLINIC | Facility: CLINIC | Age: 56
End: 2021-05-06

## 2021-05-06 NOTE — TELEPHONE ENCOUNTER
LMOM to ask about status of Cologuard Kit ordered on 03/17/2021  Let pt know to drop off kit at office Mon, Tues, or Weds or call [employer] for doorstep pickup

## 2021-06-24 ENCOUNTER — VBI (OUTPATIENT)
Dept: ADMINISTRATIVE | Facility: OTHER | Age: 56
End: 2021-06-24

## 2021-07-09 DIAGNOSIS — K21.9 GASTROESOPHAGEAL REFLUX DISEASE WITHOUT ESOPHAGITIS: ICD-10-CM

## 2021-07-09 RX ORDER — OMEPRAZOLE 40 MG/1
CAPSULE, DELAYED RELEASE ORAL
Qty: 90 CAPSULE | Refills: 1 | Status: SHIPPED | OUTPATIENT
Start: 2021-07-09 | End: 2021-12-20

## 2021-07-27 DIAGNOSIS — E03.9 ACQUIRED HYPOTHYROIDISM: Primary | ICD-10-CM

## 2021-07-27 RX ORDER — LEVOTHYROXINE SODIUM 88 MCG
88 TABLET ORAL DAILY
Qty: 90 TABLET | Refills: 1 | Status: SHIPPED | OUTPATIENT
Start: 2021-07-27 | End: 2021-07-29 | Stop reason: SDUPTHER

## 2021-07-27 NOTE — TELEPHONE ENCOUNTER
----- Message from Farzaneh Jimenez sent at 7/27/2021  2:56 PM EDT -----  Regarding: RE: Visit Follow-Up Question  Contact: [phone]  My pharmacy is [address] [place] [address]  ----- Message -----  From: Buster Fowler  Sent: 7/27/21 8:26 AM  To: Farzaneh Jimenez  Subject: RE: Visit Follow-Up Question    Good morning,    The doctor said we can try branded Synthroid at the 88mcg dose  What pharmacy would you like this prescription sent to? Thanks       ----- Message -----       From:Pantera Jimenez       Sent:7/23/2021  7:24 PM EDT         To:Hope Bailon Staff, TERRANCE    Subject:Visit Follow-Up Question    Roger Avalos  I responded to a robo call cancelling my appt  I had to work  I am out of the Levothyroxine for 4 days  The doseage did not seem to help me  I either need the name brand Synthroid or a higher dose  I would prefer to have a prior auth and do the name brand than keep going up in [address]  I am happy to reschedule but I was NOT a no-show  I am currently using up the 75 I had left over

## 2021-07-29 DIAGNOSIS — E03.9 ACQUIRED HYPOTHYROIDISM: ICD-10-CM

## 2021-07-29 RX ORDER — LEVOTHYROXINE SODIUM 88 MCG
88 TABLET ORAL DAILY
Qty: 90 TABLET | Refills: 1 | Status: SHIPPED | OUTPATIENT
Start: 2021-07-29 | End: 2021-10-29 | Stop reason: SDUPTHER

## 2021-07-29 NOTE — TELEPHONE ENCOUNTER
----- Message from Estrella Jimenez sent at 7/28/2021  7:20 PM EDT -----  Regarding: RE: Visit Follow-Up Question  Contact: [phone]  We just found out [email or web address] mail [facility] will not accept the Synthroid coupon  Would you send a script to [facility] in [facility]  We are sorry for the inconvenience but using the coupon is alot less expensive  Tracy Soriano    ----- Message -----  From: Angeline Castaneda  Sent: 7/27/21 8:26 AM  To: Estrella Jimenez  Subject: RE: Visit Follow-Up Question    Good morning,    The doctor said we can try branded Synthroid at the 88mcg dose  What pharmacy would you like this prescription sent to? Thanks       ----- Message -----       From:Pantera Jimenez       Sent:7/23/2021  7:24 PM EDT         To:TERRANCE Armstrong    Subject:Visit Follow-Up Question    Roger Avalos  I responded to a robo call cancelling my appt  I had to work  I am out of the Levothyroxine for 4 days  The doseage did not seem to help me  I either need the name brand Synthroid or a higher dose  I would prefer to have a prior auth and do the name brand than keep going up in [address]  I am happy to reschedule but I was NOT a no-show  I am currently using up the 75 I had left over

## 2021-10-14 ENCOUNTER — OFFICE VISIT (OUTPATIENT)
Dept: URGENT CARE | Facility: CLINIC | Age: 56
End: 2021-10-14
Payer: COMMERCIAL

## 2021-10-14 VITALS
BODY MASS INDEX: 27.25 KG/M2 | DIASTOLIC BLOOD PRESSURE: 88 MMHG | HEIGHT: 69 IN | TEMPERATURE: 98.6 F | RESPIRATION RATE: 20 BRPM | WEIGHT: 184 LBS | SYSTOLIC BLOOD PRESSURE: 166 MMHG | HEART RATE: 97 BPM | OXYGEN SATURATION: 95 %

## 2021-10-14 DIAGNOSIS — M62.838 MUSCLE SPASM: Primary | ICD-10-CM

## 2021-10-14 PROCEDURE — S9083 URGENT CARE CENTER GLOBAL: HCPCS | Performed by: PHYSICIAN ASSISTANT

## 2021-10-14 PROCEDURE — G0382 LEV 3 HOSP TYPE B ED VISIT: HCPCS | Performed by: PHYSICIAN ASSISTANT

## 2021-10-14 RX ORDER — METHOCARBAMOL 750 MG/1
750 TABLET, FILM COATED ORAL EVERY 8 HOURS SCHEDULED
Qty: 12 TABLET | Refills: 0 | Status: SHIPPED | OUTPATIENT
Start: 2021-10-14 | End: 2022-01-19

## 2021-10-14 RX ORDER — PREDNISONE 10 MG/1
TABLET ORAL
Qty: 21 TABLET | Refills: 0 | Status: SHIPPED | OUTPATIENT
Start: 2021-10-14 | End: 2022-01-19

## 2021-10-26 ENCOUNTER — TELEPHONE (OUTPATIENT)
Dept: PHYSICAL THERAPY | Facility: OTHER | Age: 56
End: 2021-10-26

## 2021-10-29 DIAGNOSIS — E03.9 ACQUIRED HYPOTHYROIDISM: ICD-10-CM

## 2021-11-01 RX ORDER — LEVOTHYROXINE SODIUM 88 MCG
88 TABLET ORAL DAILY
Qty: 90 TABLET | Refills: 0 | Status: SHIPPED | OUTPATIENT
Start: 2021-11-01 | End: 2022-01-19 | Stop reason: SDUPTHER

## 2021-12-18 DIAGNOSIS — K21.9 GASTROESOPHAGEAL REFLUX DISEASE WITHOUT ESOPHAGITIS: ICD-10-CM

## 2021-12-20 RX ORDER — OMEPRAZOLE 40 MG/1
CAPSULE, DELAYED RELEASE ORAL
Qty: 90 CAPSULE | Refills: 0 | Status: SHIPPED | OUTPATIENT
Start: 2021-12-20 | End: 2022-04-27 | Stop reason: SDUPTHER

## 2022-01-11 ENCOUNTER — TELEPHONE (OUTPATIENT)
Dept: INTERNAL MEDICINE CLINIC | Facility: CLINIC | Age: 57
End: 2022-01-11

## 2022-01-11 DIAGNOSIS — E03.9 ACQUIRED HYPOTHYROIDISM: Primary | ICD-10-CM

## 2022-01-11 DIAGNOSIS — E78.2 MIXED HYPERLIPIDEMIA: ICD-10-CM

## 2022-01-11 DIAGNOSIS — R73.01 IMPAIRED FASTING GLUCOSE: ICD-10-CM

## 2022-01-11 NOTE — TELEPHONE ENCOUNTER
Patient scheduled appointment for 1/19  He wants to know if you want him to have any blood work done prior to this appointment?

## 2022-01-14 ENCOUNTER — APPOINTMENT (OUTPATIENT)
Dept: LAB | Facility: CLINIC | Age: 57
End: 2022-01-14
Payer: COMMERCIAL

## 2022-01-14 DIAGNOSIS — E03.9 ACQUIRED HYPOTHYROIDISM: ICD-10-CM

## 2022-01-14 DIAGNOSIS — R73.01 IMPAIRED FASTING GLUCOSE: ICD-10-CM

## 2022-01-14 DIAGNOSIS — E78.2 MIXED HYPERLIPIDEMIA: ICD-10-CM

## 2022-01-14 LAB
ALBUMIN SERPL BCP-MCNC: 4.3 G/DL (ref 3.5–5)
ALP SERPL-CCNC: 70 U/L (ref 46–116)
ALT SERPL W P-5'-P-CCNC: 42 U/L (ref 12–78)
ANION GAP SERPL CALCULATED.3IONS-SCNC: 2 MMOL/L (ref 4–13)
AST SERPL W P-5'-P-CCNC: 25 U/L (ref 5–45)
BASOPHILS # BLD AUTO: 0.05 THOUSANDS/ΜL (ref 0–0.1)
BASOPHILS NFR BLD AUTO: 1 % (ref 0–1)
BILIRUB SERPL-MCNC: 0.84 MG/DL (ref 0.2–1)
BUN SERPL-MCNC: 19 MG/DL (ref 5–25)
CALCIUM SERPL-MCNC: 8.9 MG/DL (ref 8.3–10.1)
CHLORIDE SERPL-SCNC: 107 MMOL/L (ref 100–108)
CHOLEST SERPL-MCNC: 180 MG/DL
CO2 SERPL-SCNC: 30 MMOL/L (ref 21–32)
CREAT SERPL-MCNC: 0.88 MG/DL (ref 0.6–1.3)
EOSINOPHIL # BLD AUTO: 0.15 THOUSAND/ΜL (ref 0–0.61)
EOSINOPHIL NFR BLD AUTO: 2 % (ref 0–6)
ERYTHROCYTE [DISTWIDTH] IN BLOOD BY AUTOMATED COUNT: 11.8 % (ref 11.6–15.1)
EST. AVERAGE GLUCOSE BLD GHB EST-MCNC: 131 MG/DL
GFR SERPL CREATININE-BSD FRML MDRD: 94 ML/MIN/1.73SQ M
GLUCOSE P FAST SERPL-MCNC: 138 MG/DL (ref 65–99)
HBA1C MFR BLD: 6.2 %
HCT VFR BLD AUTO: 45.4 % (ref 36.5–49.3)
HDLC SERPL-MCNC: 31 MG/DL
HGB BLD-MCNC: 16.2 G/DL (ref 12–17)
IMM GRANULOCYTES # BLD AUTO: 0.02 THOUSAND/UL (ref 0–0.2)
IMM GRANULOCYTES NFR BLD AUTO: 0 % (ref 0–2)
LDLC SERPL CALC-MCNC: 122 MG/DL (ref 0–100)
LYMPHOCYTES # BLD AUTO: 1.25 THOUSANDS/ΜL (ref 0.6–4.47)
LYMPHOCYTES NFR BLD AUTO: 18 % (ref 14–44)
MCH RBC QN AUTO: 32.3 PG (ref 26.8–34.3)
MCHC RBC AUTO-ENTMCNC: 35.7 G/DL (ref 31.4–37.4)
MCV RBC AUTO: 91 FL (ref 82–98)
MONOCYTES # BLD AUTO: 0.71 THOUSAND/ΜL (ref 0.17–1.22)
MONOCYTES NFR BLD AUTO: 10 % (ref 4–12)
NEUTROPHILS # BLD AUTO: 4.76 THOUSANDS/ΜL (ref 1.85–7.62)
NEUTS SEG NFR BLD AUTO: 69 % (ref 43–75)
NRBC BLD AUTO-RTO: 0 /100 WBCS
PLATELET # BLD AUTO: 168 THOUSANDS/UL (ref 149–390)
PMV BLD AUTO: 11.4 FL (ref 8.9–12.7)
POTASSIUM SERPL-SCNC: 4.3 MMOL/L (ref 3.5–5.3)
PROT SERPL-MCNC: 7 G/DL (ref 6.4–8.2)
RBC # BLD AUTO: 5.01 MILLION/UL (ref 3.88–5.62)
SODIUM SERPL-SCNC: 139 MMOL/L (ref 136–145)
T4 FREE SERPL-MCNC: 0.85 NG/DL (ref 0.76–1.46)
TRIGL SERPL-MCNC: 135 MG/DL
TSH SERPL DL<=0.05 MIU/L-ACNC: 3.57 UIU/ML (ref 0.36–3.74)
WBC # BLD AUTO: 6.94 THOUSAND/UL (ref 4.31–10.16)

## 2022-01-14 PROCEDURE — 80061 LIPID PANEL: CPT

## 2022-01-14 PROCEDURE — 85025 COMPLETE CBC W/AUTO DIFF WBC: CPT

## 2022-01-14 PROCEDURE — 83036 HEMOGLOBIN GLYCOSYLATED A1C: CPT

## 2022-01-14 PROCEDURE — 36415 COLL VENOUS BLD VENIPUNCTURE: CPT

## 2022-01-14 PROCEDURE — 84443 ASSAY THYROID STIM HORMONE: CPT

## 2022-01-14 PROCEDURE — 80053 COMPREHEN METABOLIC PANEL: CPT

## 2022-01-14 PROCEDURE — 84439 ASSAY OF FREE THYROXINE: CPT

## 2022-01-19 ENCOUNTER — OFFICE VISIT (OUTPATIENT)
Dept: INTERNAL MEDICINE CLINIC | Facility: CLINIC | Age: 57
End: 2022-01-19
Payer: COMMERCIAL

## 2022-01-19 VITALS
HEIGHT: 69 IN | WEIGHT: 186.6 LBS | DIASTOLIC BLOOD PRESSURE: 80 MMHG | TEMPERATURE: 97.8 F | SYSTOLIC BLOOD PRESSURE: 132 MMHG | BODY MASS INDEX: 27.64 KG/M2 | OXYGEN SATURATION: 98 % | HEART RATE: 71 BPM

## 2022-01-19 DIAGNOSIS — Z23 NEED FOR SHINGLES VACCINE: ICD-10-CM

## 2022-01-19 DIAGNOSIS — R73.01 IMPAIRED FASTING GLUCOSE: ICD-10-CM

## 2022-01-19 DIAGNOSIS — C14.0 THROAT CANCER (HCC): ICD-10-CM

## 2022-01-19 DIAGNOSIS — E78.2 MIXED HYPERLIPIDEMIA: Primary | ICD-10-CM

## 2022-01-19 DIAGNOSIS — E03.9 ACQUIRED HYPOTHYROIDISM: ICD-10-CM

## 2022-01-19 DIAGNOSIS — K21.9 GASTROESOPHAGEAL REFLUX DISEASE WITHOUT ESOPHAGITIS: ICD-10-CM

## 2022-01-19 DIAGNOSIS — Z12.11 ENCOUNTER FOR SCREENING FOR MALIGNANT NEOPLASM OF COLON: ICD-10-CM

## 2022-01-19 PROCEDURE — 90471 IMMUNIZATION ADMIN: CPT

## 2022-01-19 PROCEDURE — 3725F SCREEN DEPRESSION PERFORMED: CPT | Performed by: INTERNAL MEDICINE

## 2022-01-19 PROCEDURE — 4004F PT TOBACCO SCREEN RCVD TLK: CPT | Performed by: INTERNAL MEDICINE

## 2022-01-19 PROCEDURE — 99214 OFFICE O/P EST MOD 30 MIN: CPT | Performed by: INTERNAL MEDICINE

## 2022-01-19 PROCEDURE — 3008F BODY MASS INDEX DOCD: CPT | Performed by: INTERNAL MEDICINE

## 2022-01-19 PROCEDURE — 90750 HZV VACC RECOMBINANT IM: CPT

## 2022-01-19 RX ORDER — LEVOTHYROXINE SODIUM 88 MCG
88 TABLET ORAL DAILY
Qty: 90 TABLET | Refills: 0 | Status: SHIPPED | OUTPATIENT
Start: 2022-01-19 | End: 2022-02-02 | Stop reason: SDUPTHER

## 2022-01-19 RX ORDER — ATORVASTATIN CALCIUM 40 MG/1
40 TABLET, FILM COATED ORAL DAILY
Qty: 90 TABLET | Refills: 1 | Status: SHIPPED | OUTPATIENT
Start: 2022-01-19 | End: 2022-02-02 | Stop reason: SDUPTHER

## 2022-01-19 NOTE — PROGRESS NOTES
Assessment/Plan:  Hyperlipidemia ASCVD 16 7%, he is in agreement of starting high-intensity statin today  - will start atorvastatin 40 mg daily  - we discussed the benefits of diet and exercise and he verbalized understanding     Pre diabetes A1c 6 2  - we discussed the benefits of diet and exercise and he verbalized understanding    GERD patient currently on Prilosec 40 mg daily he states that he has tried Pepcid in the past with no improvement of his symptoms  Patient currently eats chocolates and drinks a lot of soda which increases his risk of having symptoms we discussed diet and also side effect of long-term use of PPI and he verbalized understanding  Patient states that he has seen a gastroenterologist in the past and also has had EGD a long time ago with normal findings I cannot find EGD report on epic  Patient is currently does not have alarm symptoms    Hypothyroidism patient is compliant with his Synthroid 88 mcg daily  He states that he feels much better and his symptoms are markedly improved since he started the medication  His most recent TSH 3 570 improved from 6 340  He denies symptoms of hypo/hyperthyroidism at this time will not make any changes to his medication  - will continue Synthroid 88 mcg daily    BMI Counseling: Body mass index is 27 48 kg/m²  The BMI is above normal  Nutrition recommendations include reducing portion sizes, decreasing overall calorie intake, 3-5 servings of fruits/vegetables daily, reducing fast food intake, consuming healthier snacks, decreasing soda and/or juice intake, moderation in carbohydrate intake and increasing intake of lean protein  Exercise recommendations include moderate aerobic physical activity for 150 minutes/week and exercising 3-5 times per week  1  Mixed hyperlipidemia  -     atorvastatin (LIPITOR) 40 mg tablet; Take 1 tablet (40 mg total) by mouth daily    2  Gastroesophageal reflux disease without esophagitis    3   Impaired fasting glucose    4  Acquired hypothyroidism  -     Synthroid 88 MCG tablet; Take 1 tablet (88 mcg total) by mouth daily    5  Throat cancer ([facility] [address] )    6  Encounter for screening for malignant neoplasm of colon  -     Ambulatory referral for colonoscopy; Future           Subjective:      Patient ID: Rock Gottlieb is a 60 y o  male  with a past medical history of throat cancer, GERD, prediabetes, hypothyroidism, hyperlipidemia presents for follow-up of his chronic medical condition  Regarding his GERD, it is well controlled with Prilosec we discussed the benefits of diet and also side effects of the medication (prilosec) and he verbalized understanding  Regarding his hyperlipidemia his ASCVD is 16 7 percent patient currently not on any statin, has a great family history of MI, He is in agreement to starting high-intensity starting today, prediabetes A1c 6 2 we discussed benefits of diet and exercise  Patient is currently on Synthroid 88 micrograms daily with improvement of his symptoms prior to starting the medication  Blood pressure today is 132/80  Patient denies headaches, lightheadedness, chest pain, shortness of breath, palpitation, abdominal pain, N/V/C/D, numbness tingling sensation of extremity patient looks stable not in any obvious distress on room air  HPI    The following portions of the patient's history were reviewed and updated as appropriate: He  has a past medical history of GERD (gastroesophageal reflux disease), Hepatitis C, Hypertriglyceridemia, Impaired fasting glucose, and Throat cancer ([facility] [address] )    He   Patient Active Problem List    Diagnosis Date Noted    Encounter for screening for malignant neoplasm of colon 03/17/2021    Tobacco use 03/17/2021    Acquired hypothyroidism 01/26/2021    Mixed hyperlipidemia 01/26/2021    Other fatigue 01/26/2021    Throat cancer (HCC)     Impaired fasting glucose     Hypertriglyceridemia     GERD (gastroesophageal reflux disease)     Hepatitis C He  has a past surgical history that includes Knee surgery; Replacement total knee (Right); PEG tube placement; PEG tube removal; and TRACHEOSTOMY  His family history includes Heart disease in his father; Lung cancer in his sister  He  reports that he has been smoking cigarettes  He has a 8 75 pack-year smoking history  He has never used smokeless tobacco  He reports current alcohol use of about 1 0 standard drink of alcohol per week  He reports that he does not use drugs  Current Outpatient Medications   Medication Sig Dispense Refill    atorvastatin (LIPITOR) 40 mg tablet Take 1 tablet (40 mg total) by mouth daily 90 tablet 1    omeprazole (PriLOSEC) 40 MG capsule TAKE 1 CAPSULE BY MOUTH EVERY DAY 90 capsule 0    Synthroid 88 MCG tablet Take 1 tablet (88 mcg total) by mouth daily 90 tablet 0     No current facility-administered medications for this visit  Current Outpatient Medications on File Prior to Visit   Medication Sig    omeprazole (PriLOSEC) 40 MG capsule TAKE 1 CAPSULE BY MOUTH EVERY DAY    [DISCONTINUED] methocarbamol (Robaxin-750) 750 mg tablet Take 1 tablet (750 mg total) by mouth every 8 (eight) hours    [DISCONTINUED] predniSONE 10 mg tablet Take 6 tabs on day 1, 5 tabs on day 2, 4 tabs on day 3, 3 tabs on day 4, 2 tabs on day 5, and 1 tab on day 6    [DISCONTINUED] Prevagen Extra Strength 20 MG CAPS Take 20 mg by mouth daily    [DISCONTINUED] Synthroid 88 MCG tablet Take 1 tablet (88 mcg total) by mouth daily     No current facility-administered medications on file prior to visit  He is allergic to penicillins       Review of Systems  12 point review of system unremarkable except that listed in my HPI above    Objective:      /80 (BP Location: Left arm, Patient Position: Sitting, Cuff Size: Adult)   Pulse 71   Temp 97 8 °F (36 6 °C) (Tympanic)   Ht 5' 9 09" (1 755 m)   Wt 84 6 kg (186 lb 9 6 oz)   SpO2 98% Comment: room air  BMI 27 48 kg/m²     Recent Results (from the past 1344 hour(s))   TSH, 3rd generation    Collection Time: 01/14/22  7:06 AM   Result Value Ref Range    TSH 3RD GENERATON 3 570 0 358 - 3 740 uIU/mL   T4, free Lab Collect    Collection Time: 01/14/22  7:06 AM   Result Value Ref Range    Free T4 0 85 0 76 - 1 46 ng/dL   CBC and differential    Collection Time: 01/14/22  7:06 AM   Result Value Ref Range    WBC 6 94 4 31 - 10 16 Thousand/uL    RBC 5 01 3 88 - 5 62 Million/uL    Hemoglobin 16 2 12 0 - 17 0 g/dL    Hematocrit 45 4 36 5 - 49 3 %    MCV 91 82 - 98 fL    MCH 32 3 26 8 - 34 3 pg    MCHC 35 7 31 4 - 37 4 g/dL    RDW 11 8 11 6 - 15 1 %    MPV 11 4 8 9 - 12 7 fL    Platelets 144 783 - 874 Thousands/uL    nRBC 0 /100 WBCs    Neutrophils Relative 69 43 - 75 %    Immat GRANS % 0 0 - 2 %    Lymphocytes Relative 18 14 - 44 %    Monocytes Relative 10 4 - 12 %    Eosinophils Relative 2 0 - 6 %    Basophils Relative 1 0 - 1 %    Neutrophils Absolute 4 76 1 85 - 7 62 Thousands/µL    Immature Grans Absolute 0 02 0 00 - 0 20 Thousand/uL    Lymphocytes Absolute 1 25 0 60 - 4 47 Thousands/µL    Monocytes Absolute 0 71 0 17 - 1 22 Thousand/µL    Eosinophils Absolute 0 15 0 00 - 0 61 Thousand/µL    Basophils Absolute 0 05 0 00 - 0 10 Thousands/µL   Comprehensive metabolic panel    Collection Time: 01/14/22  7:06 AM   Result Value Ref Range    Sodium 139 136 - 145 mmol/L    Potassium 4 3 3 5 - 5 3 mmol/L    Chloride 107 100 - 108 mmol/L    CO2 30 21 - 32 mmol/L    ANION GAP 2 (L) 4 - 13 mmol/L    BUN 19 5 - 25 mg/dL    Creatinine 0 88 0 60 - 1 30 mg/dL    Glucose, Fasting 138 (H) 65 - 99 mg/dL    Calcium 8 9 8 3 - 10 1 mg/dL    AST 25 5 - 45 U/L    ALT 42 12 - 78 U/L    Alkaline Phosphatase 70 46 - 116 U/L    Total Protein 7 0 6 4 - 8 2 g/dL    Albumin 4 3 3 5 - 5 0 g/dL    Total Bilirubin 0 84 0 20 - 1 00 mg/dL    eGFR 94 ml/min/1 73sq m   Hemoglobin A1C    Collection Time: 01/14/22  7:06 AM   Result Value Ref Range    Hemoglobin A1C 6 2 (H) Normal 3 8-5 6%; PreDiabetic 5 7-6 4%; Diabetic >=6 5%; Glycemic control for adults with diabetes <7 0% %     mg/dl   Lipid Panel with Direct LDL reflex    Collection Time: 01/14/22  7:06 AM   Result Value Ref Range    Cholesterol 180 See Comment mg/dL    Triglycerides 135 See Comment mg/dL    HDL, Direct 31 (L) >=40 mg/dL    LDL Calculated 122 (H) 0 - 100 mg/dL       Physical Exam  Vitals and nursing note reviewed  Constitutional:       Appearance: He is well-developed  HENT:      Head: Normocephalic and atraumatic  Mouth/Throat:      Mouth: Mucous membranes are moist       Pharynx: Oropharynx is clear  Eyes:      General: No scleral icterus  Extraocular Movements: Extraocular movements intact  Conjunctiva/sclera: Conjunctivae normal    Cardiovascular:      Rate and Rhythm: Normal rate and regular rhythm  Heart sounds: No murmur heard  Pulmonary:      Effort: Pulmonary effort is normal  No respiratory distress  Breath sounds: Normal breath sounds  Abdominal:      Palpations: Abdomen is soft  Tenderness: There is no abdominal tenderness  Musculoskeletal:      Cervical back: Neck supple  Skin:     General: Skin is warm and dry  Capillary Refill: Capillary refill takes less than 2 seconds  Neurological:      General: No focal deficit present  Mental Status: He is alert and oriented to person, place, and time     Psychiatric:         Mood and Affect: Mood normal          Behavior: Behavior normal

## 2022-02-02 ENCOUNTER — TELEPHONE (OUTPATIENT)
Dept: INTERNAL MEDICINE CLINIC | Facility: CLINIC | Age: 57
End: 2022-02-02

## 2022-02-02 DIAGNOSIS — E78.2 MIXED HYPERLIPIDEMIA: ICD-10-CM

## 2022-02-02 DIAGNOSIS — E03.9 ACQUIRED HYPOTHYROIDISM: ICD-10-CM

## 2022-02-02 RX ORDER — LEVOTHYROXINE SODIUM 88 MCG
88 TABLET ORAL DAILY
Qty: 90 TABLET | Refills: 0 | Status: SHIPPED | OUTPATIENT
Start: 2022-02-02 | End: 2022-04-19 | Stop reason: SDUPTHER

## 2022-02-02 RX ORDER — ATORVASTATIN CALCIUM 40 MG/1
40 TABLET, FILM COATED ORAL DAILY
Qty: 90 TABLET | Refills: 1 | Status: SHIPPED | OUTPATIENT
Start: 2022-02-02 | End: 2022-04-19 | Stop reason: SDUPTHER

## 2022-02-02 NOTE — TELEPHONE ENCOUNTER
Patient's Insurance changed and he needs his medications resent:     2 DIFFERENT PHARMACIES:  Synthroid - [facility] in [facility]   Atorvastatin - [facility]     Please send

## 2022-02-15 ENCOUNTER — TELEPHONE (OUTPATIENT)
Dept: GASTROENTEROLOGY | Facility: HOSPITAL | Age: 57
End: 2022-02-15

## 2022-02-16 ENCOUNTER — ANESTHESIA EVENT (OUTPATIENT)
Dept: GASTROENTEROLOGY | Facility: HOSPITAL | Age: 57
End: 2022-02-16

## 2022-02-16 ENCOUNTER — ANESTHESIA (OUTPATIENT)
Dept: GASTROENTEROLOGY | Facility: HOSPITAL | Age: 57
End: 2022-02-16

## 2022-02-16 ENCOUNTER — HOSPITAL ENCOUNTER (OUTPATIENT)
Dept: GASTROENTEROLOGY | Facility: HOSPITAL | Age: 57
Setting detail: OUTPATIENT SURGERY
Discharge: HOME | End: 2022-02-16
Attending: INTERNAL MEDICINE
Payer: COMMERCIAL

## 2022-02-16 VITALS
HEART RATE: 70 BPM | DIASTOLIC BLOOD PRESSURE: 74 MMHG | WEIGHT: 186 LBS | HEIGHT: 69 IN | BODY MASS INDEX: 27.55 KG/M2 | TEMPERATURE: 97.3 F | OXYGEN SATURATION: 97 % | RESPIRATION RATE: 18 BRPM | SYSTOLIC BLOOD PRESSURE: 122 MMHG

## 2022-02-16 DIAGNOSIS — Z12.11 ENCOUNTER FOR SCREENING FOR MALIGNANT NEOPLASM OF COLON: ICD-10-CM

## 2022-02-16 PROCEDURE — 88305 TISSUE EXAM BY PATHOLOGIST: CPT | Performed by: PATHOLOGY

## 2022-02-16 RX ORDER — SODIUM CHLORIDE 9 MG/ML
INJECTION, SOLUTION INTRAVENOUS CONTINUOUS PRN
Status: DISCONTINUED | OUTPATIENT
Start: 2022-02-16 | End: 2022-02-16

## 2022-02-16 RX ORDER — LIDOCAINE HYDROCHLORIDE 10 MG/ML
INJECTION, SOLUTION EPIDURAL; INFILTRATION; INTRACAUDAL; PERINEURAL AS NEEDED
Status: DISCONTINUED | OUTPATIENT
Start: 2022-02-16 | End: 2022-02-16

## 2022-02-16 RX ORDER — PROPOFOL 10 MG/ML
INJECTION, EMULSION INTRAVENOUS CONTINUOUS PRN
Status: DISCONTINUED | OUTPATIENT
Start: 2022-02-16 | End: 2022-02-16

## 2022-02-16 RX ORDER — SODIUM CHLORIDE 9 MG/ML
125 INJECTION, SOLUTION INTRAVENOUS CONTINUOUS
Status: DISCONTINUED | OUTPATIENT
Start: 2022-02-16 | End: 2022-02-20 | Stop reason: HOSPADM

## 2022-02-16 RX ORDER — PROPOFOL 10 MG/ML
INJECTION, EMULSION INTRAVENOUS AS NEEDED
Status: DISCONTINUED | OUTPATIENT
Start: 2022-02-16 | End: 2022-02-16

## 2022-02-16 RX ADMIN — PROPOFOL 100 MG: 10 INJECTION, EMULSION INTRAVENOUS at 12:29

## 2022-02-16 RX ADMIN — LIDOCAINE HYDROCHLORIDE 50 MG: 10 INJECTION, SOLUTION EPIDURAL; INFILTRATION; INTRACAUDAL; PERINEURAL at 12:27

## 2022-02-16 RX ADMIN — PROPOFOL 100 MCG/KG/MIN: 10 INJECTION, EMULSION INTRAVENOUS at 12:27

## 2022-02-16 RX ADMIN — PROPOFOL 50 MG: 10 INJECTION, EMULSION INTRAVENOUS at 12:36

## 2022-02-16 RX ADMIN — SODIUM CHLORIDE: 0.9 INJECTION, SOLUTION INTRAVENOUS at 12:23

## 2022-02-16 RX ADMIN — PROPOFOL 50 MG: 10 INJECTION, EMULSION INTRAVENOUS at 12:27

## 2022-02-16 RX ADMIN — PROPOFOL 50 MG: 10 INJECTION, EMULSION INTRAVENOUS at 12:32

## 2022-02-16 RX ADMIN — PROPOFOL 50 MG: 10 INJECTION, EMULSION INTRAVENOUS at 12:40

## 2022-02-16 RX ADMIN — SODIUM CHLORIDE 125 ML/HR: 0.9 INJECTION, SOLUTION INTRAVENOUS at 12:11

## 2022-02-16 NOTE — H&P
History and Physical - [facility]  Lynne Yee [facility] 60 y o  male MRN: [id number]                  HPI: Amira Lehman is a 60y o  year old male who presents for screening colonoscopy      REVIEW OF SYSTEMS: Per the HPI, and otherwise unremarkable      Historical Information   Past Medical History:   Diagnosis Date    GERD (gastroesophageal reflux disease)     Hepatitis C     Hypertriglyceridemia     Impaired fasting glucose     Throat cancer (HCC)      Past Surgical History:   Procedure Laterality Date    KNEE SURGERY      MULTIPLE TOOTH EXTRACTIONS      PEG TUBE PLACEMENT      PEG TUBE REMOVAL      REPLACEMENT TOTAL KNEE Right     TRACHEOSTOMY       Social History   Social History     Substance and Sexual Activity   Alcohol Use Yes    Alcohol/week: 1 0 standard drink    Types: 1 Cans of beer per week     Social History     Substance and Sexual Activity   Drug Use Never     Social History     Tobacco Use   Smoking Status Current Every Day Smoker    Packs/day: 0 25    Years: 35 00    Pack years: 8 75    Types: Cigarettes   Smokeless Tobacco Never Used     Family History   Problem Relation Age of Onset    Heart disease Father     Lung cancer Sister        Meds/Allergies       Current Outpatient Medications:     atorvastatin (LIPITOR) 40 mg tablet    omeprazole (PriLOSEC) 40 MG capsule    Synthroid 88 MCG tablet    Current Facility-Administered Medications:     sodium chloride 0 9 % infusion, 125 mL/hr, Intravenous, Continuous, 125 mL/hr at 02/16/22 1211    Allergies   Allergen Reactions    Penicillins        Objective     /80   Pulse 80   Temp 98 °F (36 7 °C) (Tympanic)   Resp 18   Ht 5' 9" (1 753 m)   Wt 84 4 kg (186 lb)   SpO2 96%   BMI 27 47 kg/m²       PHYSICAL EXAM    Gen: NAD  Head: NCAT  CV: RRR  CHEST: Clear  ABD: soft, NT/ND  EXT: no edema      ASSESSMENT/PLAN:  This is a 60y o  year old male here for screening colonoscopy, and he is stable and optimized for his procedure

## 2022-02-16 NOTE — ANESTHESIA PREPROCEDURE EVALUATION
Procedure:  COLONOSCOPY    Relevant Problems   CARDIO   (+) Hypertriglyceridemia   (+) Mixed hyperlipidemia      ENDO   (+) Acquired hypothyroidism      GI/HEPATIC   (+) GERD (gastroesophageal reflux disease)   (+) Hepatitis C      Other   (+) Throat cancer ([facility] [address] ) (2010 - had throat surgery  s/p radiation)      Had a prior trach in 2010 - site is since closed      Physical Exam    Airway  Comment: Mild limited neck extension  Limited rotation of head to the L>R  Mallampati score: II  TM Distance: <3 FB  Neck ROM: limited     Dental       Cardiovascular      Pulmonary      Other Findings  Due to overall universal COVID-19 precautions and pandemic, I did not auscultate heart, lungs and abdomen due to the low yield in an asymptomatic patient  I preferred not to introduce a stethoscope during my examination due to the potential of spread of COVID-19 from patient to patient  Anesthesia Plan  ASA Score- 3     Anesthesia Type- IV sedation with anesthesia with ASA Monitors  Additional Monitors:   Airway Plan:     Comment: Last of PO prep 06:30  Plan Factors-Exercise tolerance (METS): >4 METS  Chart reviewed  Patient summary reviewed  Patient is a current smoker  Patient instructed to abstain from smoking on day of procedure  Patient did not smoke on day of surgery  Induction- intravenous  Postoperative Plan-     Informed Consent- Anesthetic plan and risks discussed with patient  I personally reviewed this patient with the CRNA  Discussed and agreed on the Anesthesia Plan with the CRNA  Dara Soulier

## 2022-02-16 NOTE — ANESTHESIA POSTPROCEDURE EVALUATION
Post-Op Assessment Note    CV Status:  Stable  Pain Score: 0    Pain management: adequate     Mental Status:  Sleepy and arousable   Hydration Status:  Euvolemic   PONV Controlled:  Controlled   Airway Patency:  Patent      Post Op Vitals Reviewed: Yes      Staff: Anesthesiologist, CRNA         No complications documented      BP   102/66   Temp    97 3   Pulse  89   Resp   16   SpO2   96%

## 2022-03-21 ENCOUNTER — CLINICAL SUPPORT (OUTPATIENT)
Dept: INTERNAL MEDICINE CLINIC | Facility: CLINIC | Age: 57
End: 2022-03-21
Payer: COMMERCIAL

## 2022-03-21 DIAGNOSIS — Z23 ENCOUNTER FOR IMMUNIZATION: Primary | ICD-10-CM

## 2022-03-21 PROCEDURE — 90750 HZV VACC RECOMBINANT IM: CPT

## 2022-03-21 PROCEDURE — 90471 IMMUNIZATION ADMIN: CPT

## 2022-04-19 DIAGNOSIS — E78.2 MIXED HYPERLIPIDEMIA: ICD-10-CM

## 2022-04-19 DIAGNOSIS — E03.9 ACQUIRED HYPOTHYROIDISM: ICD-10-CM

## 2022-04-19 RX ORDER — LEVOTHYROXINE SODIUM 88 MCG
88 TABLET ORAL DAILY
Qty: 90 TABLET | Refills: 0 | Status: SHIPPED | OUTPATIENT
Start: 2022-04-19

## 2022-04-19 RX ORDER — ATORVASTATIN CALCIUM 40 MG/1
40 TABLET, FILM COATED ORAL DAILY
Qty: 90 TABLET | Refills: 0 | Status: SHIPPED | OUTPATIENT
Start: 2022-04-19

## 2022-04-27 ENCOUNTER — PATIENT MESSAGE (OUTPATIENT)
Dept: INTERNAL MEDICINE CLINIC | Facility: CLINIC | Age: 57
End: 2022-04-27

## 2022-04-27 DIAGNOSIS — K21.9 GASTROESOPHAGEAL REFLUX DISEASE WITHOUT ESOPHAGITIS: ICD-10-CM

## 2022-04-27 RX ORDER — OMEPRAZOLE 40 MG/1
40 CAPSULE, DELAYED RELEASE ORAL DAILY
Qty: 90 CAPSULE | Refills: 0 | Status: SHIPPED | OUTPATIENT
Start: 2022-04-27

## 2022-08-22 ENCOUNTER — TELEPHONE (OUTPATIENT)
Dept: ENDOCRINOLOGY | Facility: CLINIC | Age: 57
End: 2022-08-22

## 2022-08-22 RX ORDER — OMEPRAZOLE 40 MG/1
40 CAPSULE, DELAYED RELEASE ORAL DAILY
Qty: 90 CAPSULE | Refills: 0 | Status: SHIPPED | OUTPATIENT
Start: 2022-08-22 | End: 2022-09-01 | Stop reason: SDUPTHER

## 2022-08-22 NOTE — TELEPHONE ENCOUNTER
----- Message from Bang Boyle sent at 8/22/2022  2:42 PM EDT -----  Regarding: FW: Thyroid med  Please call to schedule an appt before refill is sent to pharmacy  Thanks   ----- Message -----  From: Sindy Page  Sent: 8/22/2022   1:55 PM EDT  To: , #  Subject: Thyroid med                                      [id number] [address] but i have been out for awhile now  Can you give me a rx while i wait for my appt?  Im going on [place] wed

## 2022-08-22 NOTE — PATIENT COMMUNICATION
Refill   LA:  1-19-22   NA:   None, spoke to patient, he will call back to schedule an appt   When he gets home

## 2022-08-24 DIAGNOSIS — E03.9 ACQUIRED HYPOTHYROIDISM: ICD-10-CM

## 2022-08-24 RX ORDER — LEVOTHYROXINE SODIUM 88 MCG
88 TABLET ORAL DAILY
Qty: 90 TABLET | Refills: 0 | Status: SHIPPED | OUTPATIENT
Start: 2022-08-24 | End: 2022-09-01 | Stop reason: SDUPTHER

## 2022-08-25 ENCOUNTER — RA CDI HCC (OUTPATIENT)
Dept: OTHER | Facility: HOSPITAL | Age: 57
End: 2022-08-25

## 2022-08-25 NOTE — PROGRESS NOTES
[facility]  coding opportunities       Chart reviewed, no opportunity found: CHART REVIEWED, NO OPPORTUNITY FOUND     Patients Insurance     Commercial Insurance: [address] [place] [address]

## 2022-09-01 ENCOUNTER — OFFICE VISIT (OUTPATIENT)
Dept: INTERNAL MEDICINE CLINIC | Facility: CLINIC | Age: 57
End: 2022-09-01
Payer: COMMERCIAL

## 2022-09-01 VITALS
OXYGEN SATURATION: 97 % | SYSTOLIC BLOOD PRESSURE: 136 MMHG | HEART RATE: 90 BPM | DIASTOLIC BLOOD PRESSURE: 80 MMHG | HEIGHT: 69 IN | TEMPERATURE: 98.2 F | BODY MASS INDEX: 27.55 KG/M2 | WEIGHT: 186 LBS

## 2022-09-01 DIAGNOSIS — C14.0 THROAT CANCER (HCC): ICD-10-CM

## 2022-09-01 DIAGNOSIS — B18.2 CHRONIC HEPATITIS C WITHOUT HEPATIC COMA (HCC): ICD-10-CM

## 2022-09-01 DIAGNOSIS — E78.2 MIXED HYPERLIPIDEMIA: ICD-10-CM

## 2022-09-01 DIAGNOSIS — Z00.00 ANNUAL PHYSICAL EXAM: ICD-10-CM

## 2022-09-01 DIAGNOSIS — M79.671 ACUTE FOOT PAIN, RIGHT: ICD-10-CM

## 2022-09-01 DIAGNOSIS — E03.9 ACQUIRED HYPOTHYROIDISM: Primary | ICD-10-CM

## 2022-09-01 DIAGNOSIS — R73.01 IMPAIRED FASTING GLUCOSE: ICD-10-CM

## 2022-09-01 DIAGNOSIS — K21.9 GASTROESOPHAGEAL REFLUX DISEASE WITHOUT ESOPHAGITIS: ICD-10-CM

## 2022-09-01 PROCEDURE — 99396 PREV VISIT EST AGE 40-64: CPT | Performed by: INTERNAL MEDICINE

## 2022-09-01 PROCEDURE — 99214 OFFICE O/P EST MOD 30 MIN: CPT | Performed by: INTERNAL MEDICINE

## 2022-09-01 RX ORDER — OMEPRAZOLE 40 MG/1
40 CAPSULE, DELAYED RELEASE ORAL DAILY
Qty: 90 CAPSULE | Refills: 1 | Status: SHIPPED | OUTPATIENT
Start: 2022-09-01

## 2022-09-01 RX ORDER — ATORVASTATIN CALCIUM 40 MG/1
40 TABLET, FILM COATED ORAL DAILY
Qty: 90 TABLET | Refills: 1 | Status: SHIPPED | OUTPATIENT
Start: 2022-09-01

## 2022-09-01 RX ORDER — LEVOTHYROXINE SODIUM 88 MCG
88 TABLET ORAL DAILY
Qty: 90 TABLET | Refills: 1 | Status: SHIPPED | OUTPATIENT
Start: 2022-09-01

## 2022-09-01 NOTE — PROGRESS NOTES
Assessment/Plan:      Tobacco Cessation Counseling: Tobacco cessation counseling was provided  The patient is sincerely urged to quit consumption of tobacco  He is ready to quit tobacco              1  Acquired hypothyroidism  -     Synthroid 88 MCG tablet; Take 1 tablet (88 mcg total) by mouth daily  -     CBC and differential; Future  -     TSH, 3rd generation; Future    2  Impaired fasting glucose  -     Hemoglobin A1C; Future    3  Throat cancer [facility])  -     Ambulatory Referral to Hematology / Oncology; Future    4  Gastroesophageal reflux disease without esophagitis  -     omeprazole (PriLOSEC) 40 MG capsule; Take 1 capsule (40 mg total) by mouth daily    5  Mixed hyperlipidemia  -     atorvastatin (LIPITOR) 40 mg tablet; Take 1 tablet (40 mg total) by mouth daily  -     Comprehensive metabolic panel; Future  -     Lipid Panel with Direct LDL reflex; Future    6  Chronic hepatitis C without hepatic coma (HCC)    7  Annual physical exam    8  Acute foot pain, right  -     Ambulatory Referral to Podiatry; Future  -     XR foot 3+ vw right; Future; Expected date: 09/01/2022         Subjective:      Patient ID: Kamla Zhong is a 59 y o  male  Follow-up on multiple medical problems to ensure the stable on current medication, right foot pain, no trauma, also physical      The following portions of the patient's history were reviewed and updated as appropriate: He  has a past medical history of GERD (gastroesophageal reflux disease), Hepatitis C, Hypertriglyceridemia, Impaired fasting glucose, and Throat cancer ([facility] [address] )    He   Patient Active Problem List    Diagnosis Date Noted    Encounter for screening for malignant neoplasm of colon 03/17/2021    Tobacco use 03/17/2021    Acquired hypothyroidism 01/26/2021    Mixed hyperlipidemia 01/26/2021    Other fatigue 01/26/2021    Throat cancer ([facility] [address] )     Impaired fasting glucose     Hypertriglyceridemia     GERD (gastroesophageal reflux disease)     Chronic hepatitis C without hepatic coma ([facility] [address] )      He  has a past surgical history that includes Knee surgery; Replacement total knee (Right); PEG tube placement; PEG tube removal; TRACHEOSTOMY; and Multiple tooth extractions  His family history includes Heart disease in his father; Lung cancer in his sister  He  reports that he has been smoking cigarettes  He has a 8 75 pack-year smoking history  He has never used smokeless tobacco  He reports current alcohol use of about 1 0 standard drink of alcohol per week  He reports that he does not use drugs  Current Outpatient Medications   Medication Sig Dispense Refill    atorvastatin (LIPITOR) 40 mg tablet Take 1 tablet (40 mg total) by mouth daily 90 tablet 1    omeprazole (PriLOSEC) 40 MG capsule Take 1 capsule (40 mg total) by mouth daily 90 capsule 1    Synthroid 88 MCG tablet Take 1 tablet (88 mcg total) by mouth daily 90 tablet 1     No current facility-administered medications for this visit  Current Outpatient Medications on File Prior to Visit   Medication Sig    [DISCONTINUED] atorvastatin (LIPITOR) 40 mg tablet Take 1 tablet (40 mg total) by mouth daily    [DISCONTINUED] omeprazole (PriLOSEC) 40 MG capsule Take 1 capsule (40 mg total) by mouth daily    [DISCONTINUED] Synthroid 88 MCG tablet Take 1 tablet (88 mcg total) by mouth daily     No current facility-administered medications on file prior to visit  He is allergic to penicillins       Review of Systems   Constitutional: Negative for chills and fever  HENT: Negative for congestion, ear pain and sore throat  Eyes: Negative for pain  Respiratory: Negative for cough and shortness of breath  Cardiovascular: Negative for chest pain and leg swelling  Gastrointestinal: Negative for abdominal pain, nausea and vomiting  Endocrine: Negative for polyuria  Genitourinary: Negative for difficulty urinating, frequency and urgency  Musculoskeletal: Positive for arthralgias   Negative for back pain    Skin: Negative for rash  Neurological: Negative for weakness and headaches  Psychiatric/Behavioral: Negative for sleep disturbance  The patient is not nervous/anxious  Objective:      /80 (BP Location: Left arm, Patient Position: Sitting, Cuff Size: Adult)   Pulse 90   Temp 98 2 °F (36 8 °C) (Temporal)   Ht 5' 9" (1 753 m)   Wt 84 4 kg (186 lb)   SpO2 97%   BMI 27 47 kg/m²     No results found for this or any previous visit (from the past 1344 hour(s))  Physical Exam  Constitutional:       Appearance: Normal appearance  HENT:      Head: Normocephalic  Right Ear: Tympanic membrane, ear canal and external ear normal       Left Ear: Tympanic membrane, ear canal and external ear normal       Nose: Nose normal  No congestion  Mouth/Throat:      Mouth: Mucous membranes are moist       Pharynx: Oropharynx is clear  No oropharyngeal exudate or posterior oropharyngeal erythema  Eyes:      Extraocular Movements: Extraocular movements intact  Conjunctiva/sclera: Conjunctivae normal       Pupils: Pupils are equal, round, and reactive to light  Cardiovascular:      Rate and Rhythm: Normal rate and regular rhythm  Heart sounds: Normal heart sounds  No murmur heard  Pulmonary:      Effort: Pulmonary effort is normal       Breath sounds: Normal breath sounds  No wheezing or rales  Abdominal:      General: Bowel sounds are normal  There is no distension  Palpations: Abdomen is soft  Tenderness: There is no abdominal tenderness  Musculoskeletal:      Cervical back: Normal range of motion and neck supple  Right lower leg: No edema  Left lower leg: No edema  Comments: Right foot exam-medially tenderness present at the calcaneal bone towards the plantar surface, skin looks normal   Lymphadenopathy:      Cervical: No cervical adenopathy  Skin:     General: Skin is warm  Neurological:      General: No focal deficit present        Mental Status: He is alert and oriented to person, place, and time

## 2022-09-01 NOTE — PROGRESS NOTES
ADULT ANNUAL [address] [facility] INTERNAL MEDICINE [facility]    NAME: Jessica Jimenez  AGE: 59 y o  SEX: male  : 1965     DATE: 2022     Assessment and Plan:     Problem List Items Addressed This Visit        Digestive    Throat cancer [facility])    Relevant Orders    Ambulatory Referral to Hematology / Oncology    GERD (gastroesophageal reflux disease)    Relevant Medications    omeprazole (PriLOSEC) 40 MG capsule    Chronic hepatitis C without hepatic coma (HCC)       Endocrine    Impaired fasting glucose    Relevant Orders    Hemoglobin A1C    Acquired hypothyroidism - Primary    Relevant Medications    Synthroid 88 MCG tablet    Other Relevant Orders    CBC and differential    TSH, 3rd generation       Other    Mixed hyperlipidemia    Relevant Medications    atorvastatin (LIPITOR) 40 mg tablet    Other Relevant Orders    Comprehensive metabolic panel    Lipid Panel with Direct LDL reflex      Other Visit Diagnoses     Annual physical exam        Acute foot pain, right        Relevant Orders    Ambulatory Referral to Podiatry    XR foot 3+ vw right          Immunizations and preventive care screenings were discussed with patient today  Appropriate education was printed on patient's after visit summary  Counseling:  Exercise: the importance of regular exercise/physical activity was discussed  Recommend exercise 3-5 times per week for at least 30 minutes  Tobacco Cessation Counseling: Tobacco cessation counseling was provided  The patient is sincerely urged to quit consumption of tobacco  He is ready to quit tobacco          Return in about 14 weeks (around 2022)       Chief Complaint:     Chief Complaint   Patient presents with    Follow-up     F/u hypothyroidism          Annual physical      History of Present Illness:     Adult Annual Physical   Patient here for a comprehensive physical exam  The patient reports problems - knee pain left     Diet and Physical Activity  Diet/Nutrition: well balanced diet  Exercise: moderate cardiovascular exercise  Depression Screening  PHQ-2/9 Depression Screening         General Health  Sleep: sleeps well  Hearing: normal - bilateral   Vision: vision problems: advised to see eye doctor  Dental: no dental visits for >1 year   Health  Symptoms include: none     Review of Systems:     Review of Systems   Constitutional: Negative for chills and fever  HENT: Negative for congestion, ear pain and sore throat  Eyes: Negative for pain  Respiratory: Negative for cough and shortness of breath  Cardiovascular: Negative for chest pain and leg swelling  Gastrointestinal: Negative for abdominal pain, nausea and vomiting  Endocrine: Negative for polyuria  Genitourinary: Negative for difficulty urinating, frequency and urgency  Musculoskeletal: Positive for arthralgias  Negative for back pain  Skin: Negative for rash  Neurological: Negative for weakness and headaches  Psychiatric/Behavioral: Negative for sleep disturbance  The patient is not nervous/anxious         Past Medical History:     Past Medical History:   Diagnosis Date    GERD (gastroesophageal reflux disease)     Hepatitis C     Hypertriglyceridemia     Impaired fasting glucose     Throat cancer (HCC)       Past Surgical History:     Past Surgical History:   Procedure Laterality Date    KNEE SURGERY      MULTIPLE TOOTH EXTRACTIONS      PEG TUBE PLACEMENT      PEG TUBE REMOVAL      REPLACEMENT TOTAL KNEE Right     TRACHEOSTOMY        Family History:     Family History   Problem Relation Age of Onset    Heart disease Father     Lung cancer Sister       Social History:     Social History     Socioeconomic History    Marital status: Registered Domestic Partner     Spouse name: None    Number of children: None    Years of education: None    Highest education level: None   Occupational History    None   Tobacco Use    Smoking status: Current Every Day Smoker     Packs/day: 0 25     Years: 35 00     Pack years: 8 75     Types: Cigarettes    Smokeless tobacco: Never Used   Vaping Use    Vaping Use: Never used   Substance and Sexual Activity    Alcohol use: Yes     Alcohol/week: 1 0 standard drink     Types: 1 Cans of beer per week    Drug use: Never    Sexual activity: None   Other Topics Concern    None   Social History Narrative    None     Social Determinants of Health     Financial Resource Strain: Not on file   Food Insecurity: Not on file   Transportation Needs: Not on file   Physical Activity: Not on file   Stress: Not on file   Social Connections: Not on file   Intimate Partner Violence: Not on file   Housing Stability: Not on file      Current Medications:     Current Outpatient Medications   Medication Sig Dispense Refill    atorvastatin (LIPITOR) 40 mg tablet Take 1 tablet (40 mg total) by mouth daily 90 tablet 1    omeprazole (PriLOSEC) 40 MG capsule Take 1 capsule (40 mg total) by mouth daily 90 capsule 1    Synthroid 88 MCG tablet Take 1 tablet (88 mcg total) by mouth daily 90 tablet 1     No current facility-administered medications for this visit  Allergies: Allergies   Allergen Reactions    Penicillins       Physical Exam:     /80 (BP Location: Left arm, Patient Position: Sitting, Cuff Size: Adult)   Pulse 90   Temp 98 2 °F (36 8 °C) (Temporal)   Ht 5' 9" (1 753 m)   Wt 84 4 kg (186 lb)   SpO2 97%   BMI 27 47 kg/m²     Physical Exam  Vitals and nursing note reviewed  Constitutional:       Appearance: He is well-developed  HENT:      Head: Normocephalic and atraumatic  Eyes:      Conjunctiva/sclera: Conjunctivae normal    Cardiovascular:      Rate and Rhythm: Normal rate and regular rhythm  Heart sounds: No murmur heard  Pulmonary:      Effort: Pulmonary effort is normal  No respiratory distress  Breath sounds: Normal breath sounds     Abdominal: Palpations: Abdomen is soft  Tenderness: There is no abdominal tenderness  Musculoskeletal:      Cervical back: Neck supple  Skin:     General: Skin is warm and dry  Neurological:      Mental Status: He is alert            Tom Caraballo MD  [address]

## 2022-09-01 NOTE — PATIENT INSTRUCTIONS

## 2022-09-07 ENCOUNTER — DOCUMENTATION (OUTPATIENT)
Dept: HEMATOLOGY ONCOLOGY | Facility: CLINIC | Age: 57
End: 2022-09-07

## 2022-09-07 ENCOUNTER — PATIENT OUTREACH (OUTPATIENT)
Dept: HEMATOLOGY ONCOLOGY | Facility: CLINIC | Age: 57
End: 2022-09-07

## 2022-09-07 NOTE — PROGRESS NOTES
Intake received/ Chart reviewed for services completed outside of [facility] 9/07/22    Pt noted to be Hx of cancer scheduled for FU with Dr Ky Muñoz  Pt responsible for medical records

## 2022-09-09 ENCOUNTER — HOSPITAL ENCOUNTER (OUTPATIENT)
Dept: RADIOLOGY | Facility: HOSPITAL | Age: 57
Discharge: HOME | End: 2022-09-09
Attending: INTERNAL MEDICINE
Payer: COMMERCIAL

## 2022-09-09 DIAGNOSIS — M79.671 ACUTE FOOT PAIN, RIGHT: ICD-10-CM

## 2022-09-09 PROCEDURE — 73630 X-RAY EXAM OF FOOT: CPT

## 2022-10-05 ENCOUNTER — TELEPHONE (OUTPATIENT)
Dept: HEMATOLOGY ONCOLOGY | Facility: CLINIC | Age: 57
End: 2022-10-05

## 2022-10-05 NOTE — TELEPHONE ENCOUNTER
Received call from patient he states Dr Johana Smith was his oncologist when he was first dx back in 2010  I advised patient I will look into obtaining his old records with our office, and call him back by end of day

## 2022-10-05 NOTE — TELEPHONE ENCOUNTER
Mau on  requesting patient fax over all records of H/O throat cancer to [phone]  If not received by the end of day 10/5/22 appointment will be canceled

## 2022-10-07 ENCOUNTER — CONSULT (OUTPATIENT)
Dept: HEMATOLOGY ONCOLOGY | Facility: CLINIC | Age: 57
End: 2022-10-07
Payer: COMMERCIAL

## 2022-10-07 ENCOUNTER — HOSPITAL ENCOUNTER (OUTPATIENT)
Dept: RADIOLOGY | Facility: HOSPITAL | Age: 57
Discharge: HOME | End: 2022-10-07
Attending: INTERNAL MEDICINE
Payer: COMMERCIAL

## 2022-10-07 VITALS
BODY MASS INDEX: 26.81 KG/M2 | TEMPERATURE: 98.1 F | DIASTOLIC BLOOD PRESSURE: 84 MMHG | RESPIRATION RATE: 16 BRPM | SYSTOLIC BLOOD PRESSURE: 124 MMHG | HEART RATE: 64 BPM | WEIGHT: 181 LBS | HEIGHT: 69 IN | OXYGEN SATURATION: 98 %

## 2022-10-07 DIAGNOSIS — C14.0 THROAT CANCER (HCC): ICD-10-CM

## 2022-10-07 DIAGNOSIS — C14.0 THROAT CANCER (HCC): Primary | ICD-10-CM

## 2022-10-07 PROBLEM — R53.83 OTHER FATIGUE: Status: RESOLVED | Noted: 2021-01-26 | Resolved: 2022-10-07

## 2022-10-07 PROCEDURE — 71046 X-RAY EXAM CHEST 2 VIEWS: CPT

## 2022-10-07 PROCEDURE — 99243 OFF/OP CNSLTJ NEW/EST LOW 30: CPT | Performed by: INTERNAL MEDICINE

## 2022-10-07 NOTE — PROGRESS NOTES
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, M0 ) status post tracheostomy and definitive concurrent radiation therapy with high-dose cisplatin at 100 milligram/meter subcutaneously for 3 doses finished in October 2010 subsequent endoscopy showed no evidence of recurrence of disease    He had radiation esophagitis with gradual improvement     Unfortunately he goes back to 1 pack of cigarettes daily, he drinks alcohol occasionally     Denies any headache blurred vision nausea vomiting diarrhea constipation dysuria hematuria melena hematochezia odynophagia dysphagia skin rash weight loss heat or cold intolerance  Review of Systems - As stated in the HPI otherwise the fourteen point review of systems was negative  Past Medical History:   Diagnosis Date    GERD (gastroesophageal reflux disease)     Hepatitis C     Hypertriglyceridemia     Impaired fasting glucose     Throat cancer [facility])        Social History     Socioeconomic History    Marital status: Registered Domestic Partner     Spouse name: None    Number of children: None    Years of education: None    Highest education level: None   Occupational History    None   Tobacco Use    Smoking status: Current Every Day Smoker     Packs/day: 0 25     Years: 35 00     Pack years: 8 75     Types: Cigarettes    Smokeless tobacco: Never Used   Vaping Use    Vaping Use: Never used   Substance and Sexual Activity    Alcohol use:  Yes     Alcohol/week: 1 0 standard drink     Types: 1 Cans of beer per week    Drug use: Never    Sexual activity: None   Other Topics Concern    None   Social History Narrative    None     Social Determinants of Health     Financial Resource Strain: Not on file   Food Insecurity: Not on file   Transportation Needs: Not on file   Physical Activity: Not on file   Stress: Not on file   Social Connections: Not on file   Intimate Partner Violence: Not on file   Housing Stability: Not on file       Family History   Problem Relation Age of Onset    Heart disease Father     Lung cancer Sister        Allergies   Allergen Reactions    Penicillins          Current Outpatient Medications:     atorvastatin (LIPITOR) 40 mg tablet, Take 1 tablet (40 mg total) by mouth daily, Disp: 90 tablet, Rfl: 1    omeprazole (PriLOSEC) 40 MG capsule, Take 1 capsule (40 mg total) by mouth daily, Disp: 90 capsule, Rfl: 1    Synthroid 88 MCG tablet, Take 1 tablet (88 mcg total) by mouth daily, Disp: 90 tablet, Rfl: 1      /84 (BP Location: Left arm, Patient Position: Sitting, Cuff Size: Adult)   Pulse 64   Temp 98 1 °F (36 7 °C)   Resp 16   Ht 5' 9" (1 753 m)   Wt 82 1 kg (181 lb)   SpO2 98%   BMI 26 73 kg/m²       General Appearance:    Alert, oriented        Eyes:    PERRL   Ears:    Normal external ear canals, both ears   Nose:   Nares normal, septum midline   Throat:   Mucosa moist  Pharynx without injection  Neck:   Supple bilateral radiation fibrosis of the skin       Lungs:     Clear to auscultation bilaterally   Chest Wall:    No tenderness or deformity    Heart:    Regular rate and rhythm       Abdomen:     Soft, non-tender, bowel sounds +, no organomegaly           Extremities:   Extremities no cyanosis or edema       Skin:   no rash or icterus  Lymph nodes:   Cervical, supraclavicular, and axillary nodes normal   Neurologic:   CNII-XII intact, normal strength, sensation and reflexes     Throughout               No results found for this or any previous visit (from the past 48 hour(s))  XR foot 3+ vw right    Result Date: 9/16/2022  Narrative: RIGHT FOOT INDICATION:   M79 [id number]: Pain in right foot   COMPARISON:  None VIEWS:  XR FOOT 3+ VW RIGHT FINDINGS: There is no acute fracture or dislocation  No significant degenerative changes  No lytic or blastic osseous lesion  Soft tissues are unremarkable  Impression: No acute osseous abnormality   Workstation performed: [id number]     ECOG :0      Assessment and plan:     57-year-old  male with history of 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

## 2022-10-12 ENCOUNTER — OFFICE VISIT (OUTPATIENT)
Dept: ENDOCRINOLOGY | Facility: CLINIC | Age: 57
End: 2022-10-12
Payer: COMMERCIAL

## 2022-10-12 VITALS
WEIGHT: 183.2 LBS | BODY MASS INDEX: 27.13 KG/M2 | DIASTOLIC BLOOD PRESSURE: 70 MMHG | OXYGEN SATURATION: 95 % | HEART RATE: 74 BPM | HEIGHT: 69 IN | SYSTOLIC BLOOD PRESSURE: 140 MMHG

## 2022-10-12 DIAGNOSIS — E78.2 MIXED HYPERLIPIDEMIA: ICD-10-CM

## 2022-10-12 DIAGNOSIS — E03.9 ACQUIRED HYPOTHYROIDISM: Primary | ICD-10-CM

## 2022-10-12 PROBLEM — Z12.11 ENCOUNTER FOR SCREENING FOR MALIGNANT NEOPLASM OF COLON: Status: RESOLVED | Noted: 2021-03-17 | Resolved: 2022-10-12

## 2022-10-12 PROCEDURE — 99213 OFFICE O/P EST LOW 20 MIN: CPT

## 2022-10-12 NOTE — ASSESSMENT & PLAN NOTE
He is due for repeat labs  Ordered TSH and T4  Continue current dose of Synthroid for now and will adjust as needed

## 2022-10-12 NOTE — PROGRESS NOTES
Established Patient Progress Note      Chief Complaint   Patient presents with   • Hypothyroidism        History of Present Illness:   Rico Power is a 59 y o  male with hyperlipidemia and hypothyroidism diagnosed January 2021  He is currently taking synthroid 88 mcg daily  He is taking this regularly and properly  He denies any symptoms of hypo/hyperthyroidism  He was initially on levothyroxine, but reports generic was not working for him  He denies any issues since starting on brand Synthroid  History of external radiation to neck b/c of throat cancer in 2010  No hx thyroid nodules  Denies FH of thyroid cancers  For hyperlipidemia, he is currently taking atorvastatin daily  Patient Active Problem List   Diagnosis   • Throat cancer ([facility] [address] )   • Impaired fasting glucose   • Hypertriglyceridemia   • GERD (gastroesophageal reflux disease)   • Chronic hepatitis C without hepatic coma (HCC)   • Acquired hypothyroidism   • Mixed hyperlipidemia   • Tobacco use      Past Medical History:   Diagnosis Date   • GERD (gastroesophageal reflux disease)    • Hepatitis C    • Hypertriglyceridemia    • Impaired fasting glucose    • Throat cancer (HCC)       Past Surgical History:   Procedure Laterality Date   • KNEE SURGERY     • MULTIPLE TOOTH EXTRACTIONS     • PEG TUBE PLACEMENT     • PEG TUBE REMOVAL     • REPLACEMENT TOTAL KNEE Right    • TRACHEOSTOMY        Family History   Problem Relation Age of Onset   • Heart disease Father    • Lung cancer Sister      Social History     Tobacco Use   • Smoking status: Current Every Day Smoker     Packs/day: 0 25     Years: 35 00     Pack years: 8 75     Types: Cigarettes   • Smokeless tobacco: Never Used   Substance Use Topics   • Alcohol use:  Yes     Alcohol/week: 1 0 standard drink     Types: 1 Cans of beer per week     Allergies   Allergen Reactions   • Penicillins          Current Outpatient Medications:   •  atorvastatin (LIPITOR) 40 mg tablet, Take 1 tablet (40 mg total) by mouth daily, Disp: 90 tablet, Rfl: 1  •  omeprazole (PriLOSEC) 40 MG capsule, Take 1 capsule (40 mg total) by mouth daily, Disp: 90 capsule, Rfl: 1  •  Synthroid 88 MCG tablet, Take 1 tablet (88 mcg total) by mouth daily, Disp: 90 tablet, Rfl: 1    Review of Systems   Constitutional: Negative for activity change, appetite change, chills, diaphoresis, fatigue, fever and unexpected weight change  HENT: Negative for sore throat, trouble swallowing and voice change  Respiratory: Negative for chest tightness and shortness of breath  Cardiovascular: Negative for chest pain, palpitations and leg swelling  Gastrointestinal: Negative for abdominal pain, constipation, diarrhea, nausea and vomiting  Endocrine: Negative for cold intolerance and heat intolerance  Neurological: Negative for dizziness, tremors, weakness, light-headedness and numbness  All other systems reviewed and are negative  Physical Exam:  Body mass index is 27 05 kg/m²  /70 (BP Location: Left arm, Patient Position: Sitting, Cuff Size: Standard)   Pulse 74   Ht 5' 9" (1 753 m)   Wt 83 1 kg (183 lb 3 2 oz)   SpO2 95%   BMI 27 05 kg/m²    Wt Readings from Last 3 Encounters:   10/12/22 83 1 kg (183 lb 3 2 oz)   10/07/22 82 1 kg (181 lb)   09/01/22 84 4 kg (186 lb)       Physical Exam  Vitals reviewed  Constitutional:       Appearance: Normal appearance  HENT:      Head: Normocephalic  Neck:      Thyroid: No thyroid mass, thyromegaly or thyroid tenderness  Cardiovascular:      Rate and Rhythm: Normal rate and regular rhythm  Pulses: Normal pulses  Heart sounds: Normal heart sounds  No murmur heard  Pulmonary:      Effort: Pulmonary effort is normal  No respiratory distress  Breath sounds: Normal breath sounds  No wheezing, rhonchi or rales  Neurological:      Mental Status: He is alert and oriented to person, place, and time     Psychiatric:         Mood and Affect: Mood normal          Behavior: Behavior normal          Thought Content: Thought content normal          Judgment: Judgment normal        Labs:   Lab Results   Component Value Date    HGBA1C 6 2 (H) 01/14/2022    HGBA1C 5 9 (H) 03/04/2021    HGBA1C 6 0 (H) 09/16/2020     Lab Results   Component Value Date    CREATININE 0 88 01/14/2022    CREATININE 1 03 03/04/2021    CREATININE 0 95 09/16/2020    BUN 19 01/14/2022     08/01/2015    K 4 3 01/14/2022     01/14/2022    CO2 30 01/14/2022     eGFR   Date Value Ref Range Status   01/14/2022 94 ml/min/1 73sq m Final     Lab Results   Component Value Date    CHOL 175 05/17/2014    HDL 31 (L) 01/14/2022    TRIG 135 01/14/2022     Lab Results   Component Value Date    ALT 42 01/14/2022    AST 25 01/14/2022    ALKPHOS 70 01/14/2022    BILITOT 0 69 08/01/2015     Lab Results   Component Value Date    MBK9HXZHRWEL 3 570 01/14/2022    FDN0VMQVRONA 6 340 (H) 04/09/2021    FMN1KGUXUFAD 8 550 (H) 03/04/2021     Lab Results   Component Value Date    FREET4 0 85 01/14/2022       Impression & Plan:    Problem List Items Addressed This Visit        Endocrine    Acquired hypothyroidism - Primary     He is due for repeat labs  Ordered TSH and T4  Continue current dose of Synthroid for now and will adjust as needed  Relevant Orders    TSH, 3rd generation    T4, free       Other    Mixed hyperlipidemia     Continue statin  He has repeat lipid panel ordered by PCP and in chart  Orders Placed This Encounter   Procedures   • TSH, 3rd generation     This is a patient instruction: This test is non-fasting  Please drink two glasses of water morning of bloodwork  Standing Status:   Future     Standing Expiration Date:   10/12/2023   • T4, free     Standing Status:   Future     Standing Expiration Date:   10/12/2023       There are no Patient Instructions on file for this visit  Discussed with the patient and all questioned fully answered   He will call me if any problems arise     TERRANCE Diaz

## 2022-10-13 ENCOUNTER — TELEPHONE (OUTPATIENT)
Dept: ENDOCRINOLOGY | Facility: CLINIC | Age: 57
End: 2022-10-13

## 2022-10-13 ENCOUNTER — APPOINTMENT (OUTPATIENT)
Dept: LAB | Facility: CLINIC | Age: 57
End: 2022-10-13
Payer: COMMERCIAL

## 2022-10-13 DIAGNOSIS — E03.9 ACQUIRED HYPOTHYROIDISM: ICD-10-CM

## 2022-10-13 LAB
T4 FREE SERPL-MCNC: 0.93 NG/DL (ref 0.76–1.46)
TSH SERPL DL<=0.05 MIU/L-ACNC: 4.22 UIU/ML (ref 0.45–4.5)

## 2022-10-13 PROCEDURE — 36415 COLL VENOUS BLD VENIPUNCTURE: CPT

## 2022-10-13 PROCEDURE — 84443 ASSAY THYROID STIM HORMONE: CPT

## 2022-10-13 PROCEDURE — 84439 ASSAY OF FREE THYROXINE: CPT

## 2022-10-13 NOTE — TELEPHONE ENCOUNTER
----- Message from [address] [place] [address] sent at 10/13/2022 12:58 PM EDT -----  Thyroid labs in normal range  Continue current dose of Synthroid

## 2022-10-17 ENCOUNTER — OFFICE VISIT (OUTPATIENT)
Dept: PODIATRY | Facility: CLINIC | Age: 57
End: 2022-10-17
Payer: COMMERCIAL

## 2022-10-17 VITALS — HEIGHT: 69 IN | WEIGHT: 182 LBS | BODY MASS INDEX: 26.96 KG/M2

## 2022-10-17 DIAGNOSIS — M72.2 PLANTAR FASCIITIS, RIGHT: Primary | ICD-10-CM

## 2022-10-17 PROCEDURE — 99203 OFFICE O/P NEW LOW 30 MIN: CPT | Performed by: PODIATRIST

## 2022-10-17 NOTE — PROGRESS NOTES
This patient was seen on 10/17/22  My role is Foot , Ankle, and Wound Specialist    SUBJECTIVE    Chief Complaint:  Heel pain     Patient ID: Carlita Up is a 59 y o  male  Rosario Blocker is here for heel pain  He notes pain in the heel and arch, particularly when getting up from rest  He denies self treatment  The following portions of the patient's history were reviewed and updated as appropriate: allergies, current medications, past family history, past medical history, past social history, past surgical history and problem list     Review of Systems   Constitutional: Negative  Respiratory: Negative  Musculoskeletal: Positive for arthralgias, gait problem and myalgias  OBJECTIVE      Ht 5' 9" (1 753 m)   Wt 82 6 kg (182 lb)   BMI 26 88 kg/m²     Foot/Ankle Musculoskeletal Exam    General      Neurological: alert      General additional comments:  I note muscle function of the anterior, posterior, evertor and invertor muscle groups of the lower leg are intact and normal  I note ROM of the ankle joint, subtalar joint, Choparts and Lisfranc's joint complexes and metatarsophalangeal joints are WNL without crepitus nor restrictions bilaterally  I note pain on palpation of the plantar-medial calcaneal tubercle at the fascial origin  Physical Exam  Vitals and nursing note reviewed  Constitutional:       General: He is not in acute distress  Appearance: Normal appearance  He is not ill-appearing, toxic-appearing or diaphoretic  HENT:      Head: Normocephalic and atraumatic  Pulmonary:      Effort: Pulmonary effort is normal       Breath sounds: Normal breath sounds  Musculoskeletal:         General: Tenderness present        Comments:  I note muscle function of the anterior, posterior, evertor and invertor muscle groups of the lower leg are intact and normal  I note ROM of the ankle joint, subtalar joint, Choparts and Lisfranc's joint complexes and metatarsophalangeal joints are WNL without crepitus nor restrictions bilaterally  I note pain on palpation of the plantar-medial calcaneal tubercle at the fascial origin  Skin:     General: Skin is warm  Capillary Refill: Capillary refill takes less than 2 seconds  Neurological:      General: No focal deficit present  Mental Status: He is alert  Psychiatric:         Mood and Affect: Mood normal              ASSESSMENT     Diagnoses and all orders for this visit:    Plantar fasciitis, right         Problem List Items Addressed This Visit        Musculoskeletal and Integument    Plantar fasciitis, right - Primary              PLAN    I recommended three times a day ice application to the heel and stretching exercises taught  I recommended to abstain from walking without a supportive shoe in the house  I discussed proper shoegear (a cross- type sneaker or workboot that is in good repair)  He will return in 4 weeks and if not improved I will consider a corticosteroid injection

## 2022-10-17 NOTE — PATIENT INSTRUCTIONS
Plantar Fasciitis Exercises   WHAT YOU NEED TO KNOW:   Plantar fasciitis exercises help stretch your plantar fascia, calf muscles, and Achilles tendon  They also help strengthen the muscles that support your heel and foot  Exercises and stretching can help prevent plantar fasciitis from getting worse or coming back  DISCHARGE INSTRUCTIONS:   Contact your healthcare provider if:   Your pain and swelling increase  You develop new knee, hip, or back pain  You have questions or concerns about your condition or care  How to do plantar fasciitis exercises:  Ask your healthcare provider when to start these exercises and how often to do them  Slant board stretch:  Stand on a slanted board with your toes higher than your heel  Press your heel into the board  Keep your knee slightly bent  Hold this position for 1 minute  Repeat 5 times  Heel stretch:  Stand up straight with your hands on a wall  Place your injured leg slightly behind your other leg  Keep your heels flat on the floor, lean forward, and bend both knees  Hold for 30 seconds  Calf stretch:  Stand up straight with your hands on a wall  Step forward so that your uninjured foot is in front of your injured foot  Keep your front leg bent and your back leg straight  Gently lean forward until you feel your calf stretch  Hold for 30 seconds and then relax  Seated plantar fascia stretch:  Sit on a firm surface, such as the floor or a mat  Extend your legs out in front of you  Raise your injured foot a few inches off the ground  Keep your leg straight  Grab the toes of your injured foot and pull them toward you  With your other hand, feel your plantar fascia  You should feel it press outward  Hold for 30 seconds  If you cannot reach your toes, loop a towel or tie around your foot  Gently pull on the towel or tie and flex your toes toward you  Heel raises:  Stand on the injured leg  Raise your other leg off the ground   Hold onto a railing or wall for balance  Slowly rise up on the toes of your injured leg  Hold for 5 seconds  Slowly lower your heel to the ground  Toe curls:  Place a towel on the floor  Put your foot flat on the towel  Grab the towel with your toes by curling them around the towel  Lift the towel up with your toes  Toe taps:  Sit down and place your foot flat on the floor  Keep your heel on the floor  Point all your toes up toward the ceiling  While the 4 smaller toes are pointed up, bend your big toe down and tap it on the ground  Do 10 to 50 taps  Point all 5 toes up toward the ceiling again  This time keep your big toe pointed up and tap the 4 smaller toes on the ground  Do 10 to 50 taps each time  Follow up with your doctor as directed:  Write down your questions so you remember to ask them during your visits  © Copyright [employer] 2022 Information is for End User's use only and may not be sold, redistributed or otherwise used for commercial purposes  All illustrations and images included in CareNotes® are the copyrighted property of [employer]  or [employer]   The above information is an  only  It is not intended as medical advice for individual conditions or treatments  Talk to your doctor, nurse or pharmacist before following any medical regimen to see if it is safe and effective for you

## 2022-11-10 ENCOUNTER — TELEMEDICINE (OUTPATIENT)
Dept: INTERNAL MEDICINE CLINIC | Facility: CLINIC | Age: 57
End: 2022-11-10

## 2022-11-10 DIAGNOSIS — U07.1 COVID-19 VIRUS DETECTED: Primary | ICD-10-CM

## 2022-11-10 NOTE — PROGRESS NOTES
Virtual Regular Visit    Verification of patient location:    Patient is located in the following state in which I hold an active license [place]  Tried to connect through [employer], we got connected, could not see him, could talk, subsequently we lost the voice, so we again started on telephone  Assessment/Plan:    Problem List Items Addressed This Visit        Other    COVID-19 virus detected - Primary               Reason for visit is   Chief Complaint   Patient presents with   • Virtual Regular Visit   • COVID-19     Tested positive today, symptoms started on sat   • Virtual Regular Visit        Encounter provider Smith Roberto MD    Provider located at [address] [address] [phone]      Recent Visits  No visits were found meeting these conditions  Showing recent visits within past 7 days and meeting all other requirements  Today's Visits  Date Type Provider Dept   11/10/22 Arvil Aase, MD [address] today's visits and meeting all other requirements  Future Appointments  No visits were found meeting these conditions  Showing future appointments within next 150 days and meeting all other requirements       The patient was identified by name and date of birth  Kin Session was informed that this is a telemedicine visit and that the visit is being conducted through the [address] [facility] platform  He agrees to proceed     My office door was closed  No one else was in the room  He acknowledged consent and understanding of privacy and security of the video platform  The patient has agreed to participate and understands they can discontinue the visit at any time  Patient is aware this is a billable service  Subjective  Kin Session is a 59 y o  male sick         Sick, cough, sore throat, COVID test positive, no shortness of breath       Past Medical History:   Diagnosis Date   • GERD (gastroesophageal reflux disease)    • Hepatitis C    • Hypertriglyceridemia    • Impaired fasting glucose    • Throat cancer (HCC)        Past Surgical History:   Procedure Laterality Date   • KNEE SURGERY     • MULTIPLE TOOTH EXTRACTIONS     • PEG TUBE PLACEMENT     • PEG TUBE REMOVAL     • REPLACEMENT TOTAL KNEE Right    • TRACHEOSTOMY         Current Outpatient Medications   Medication Sig Dispense Refill   • atorvastatin (LIPITOR) 40 mg tablet Take 1 tablet (40 mg total) by mouth daily 90 tablet 1   • omeprazole (PriLOSEC) 40 MG capsule Take 1 capsule (40 mg total) by mouth daily 90 capsule 1   • Synthroid 88 MCG tablet Take 1 tablet (88 mcg total) by mouth daily 90 tablet 1     No current facility-administered medications for this visit  Allergies   Allergen Reactions   • Penicillins        Review of Systems   Constitutional: Negative for chills and fever  HENT: Positive for sore throat  Negative for congestion and ear pain  Eyes: Negative for pain  Respiratory: Positive for cough  Negative for shortness of breath  Cardiovascular: Negative for chest pain and leg swelling  Gastrointestinal: Negative for abdominal pain, nausea and vomiting  Endocrine: Negative for polyuria  Genitourinary: Negative for difficulty urinating, frequency and urgency  Musculoskeletal: Negative for arthralgias and back pain  Skin: Negative for rash  Neurological: Negative for weakness and headaches  Psychiatric/Behavioral: Negative for sleep disturbance  The patient is not nervous/anxious  Video Exam    There were no vitals filed for this visit  Physical Exam  Constitutional:       General: He is not in acute distress       Comments: Was able to talk in long sentences          I spent 20 minutes directly with the patient during this visit

## 2022-11-10 NOTE — LETTER
November 10, 2022     Patient: Minoo Huynh  YOB: 1965  Date of Visit: 11/10/2022      To Whom it May Concern:    Aniya Joseph is under my professional care  Jorge Maldonado was seen in my office on 11/10/2022  Jorge Maldonado may return to work on 11/16/22, work excuse 11/9/2022- 11/15/2022  If you have any questions or concerns, please don't hesitate to call           Sincerely,          Mynor James MD        CC: No Recipients

## 2022-11-11 ENCOUNTER — TELEPHONE (OUTPATIENT)
Dept: PODIATRY | Facility: CLINIC | Age: 57
End: 2022-11-11

## 2022-11-11 NOTE — TELEPHONE ENCOUNTER
Called pt and left message to confirm appt on 11/14 at [facility] office   Gave number [phone]

## 2022-11-21 ENCOUNTER — DOCUMENTATION (OUTPATIENT)
Dept: HEMATOLOGY ONCOLOGY | Facility: CLINIC | Age: 57
End: 2022-11-21

## 2022-11-23 ENCOUNTER — APPOINTMENT (OUTPATIENT)
Dept: LAB | Facility: CLINIC | Age: 57
End: 2022-11-23

## 2022-11-23 DIAGNOSIS — E03.9 ACQUIRED HYPOTHYROIDISM: ICD-10-CM

## 2022-11-23 DIAGNOSIS — R73.01 IMPAIRED FASTING GLUCOSE: ICD-10-CM

## 2022-11-23 DIAGNOSIS — E78.2 MIXED HYPERLIPIDEMIA: ICD-10-CM

## 2022-11-23 LAB
ALBUMIN SERPL BCP-MCNC: 3.9 G/DL (ref 3.5–5)
ALP SERPL-CCNC: 83 U/L (ref 46–116)
ALT SERPL W P-5'-P-CCNC: 38 U/L (ref 12–78)
ANION GAP SERPL CALCULATED.3IONS-SCNC: 6 MMOL/L (ref 4–13)
AST SERPL W P-5'-P-CCNC: 15 U/L (ref 5–45)
BASOPHILS # BLD AUTO: 0.06 THOUSANDS/ÂΜL (ref 0–0.1)
BASOPHILS NFR BLD AUTO: 1 % (ref 0–1)
BILIRUB SERPL-MCNC: 0.62 MG/DL (ref 0.2–1)
BUN SERPL-MCNC: 17 MG/DL (ref 5–25)
CALCIUM SERPL-MCNC: 9.6 MG/DL (ref 8.3–10.1)
CHLORIDE SERPL-SCNC: 109 MMOL/L (ref 96–108)
CHOLEST SERPL-MCNC: 123 MG/DL
CO2 SERPL-SCNC: 26 MMOL/L (ref 21–32)
CREAT SERPL-MCNC: 0.81 MG/DL (ref 0.6–1.3)
EOSINOPHIL # BLD AUTO: 0.17 THOUSAND/ÂΜL (ref 0–0.61)
EOSINOPHIL NFR BLD AUTO: 2 % (ref 0–6)
ERYTHROCYTE [DISTWIDTH] IN BLOOD BY AUTOMATED COUNT: 11.6 % (ref 11.6–15.1)
EST. AVERAGE GLUCOSE BLD GHB EST-MCNC: 174 MG/DL
GFR SERPL CREATININE-BSD FRML MDRD: 97 ML/MIN/1.73SQ M
GLUCOSE P FAST SERPL-MCNC: 148 MG/DL (ref 65–99)
HBA1C MFR BLD: 7.7 %
HCT VFR BLD AUTO: 46.1 % (ref 36.5–49.3)
HDLC SERPL-MCNC: 33 MG/DL
HGB BLD-MCNC: 16.4 G/DL (ref 12–17)
IMM GRANULOCYTES # BLD AUTO: 0.02 THOUSAND/UL (ref 0–0.2)
IMM GRANULOCYTES NFR BLD AUTO: 0 % (ref 0–2)
LDLC SERPL CALC-MCNC: 59 MG/DL (ref 0–100)
LYMPHOCYTES # BLD AUTO: 1.56 THOUSANDS/ÂΜL (ref 0.6–4.47)
LYMPHOCYTES NFR BLD AUTO: 21 % (ref 14–44)
MCH RBC QN AUTO: 32.2 PG (ref 26.8–34.3)
MCHC RBC AUTO-ENTMCNC: 35.6 G/DL (ref 31.4–37.4)
MCV RBC AUTO: 91 FL (ref 82–98)
MONOCYTES # BLD AUTO: 0.69 THOUSAND/ÂΜL (ref 0.17–1.22)
MONOCYTES NFR BLD AUTO: 9 % (ref 4–12)
NEUTROPHILS # BLD AUTO: 4.98 THOUSANDS/ÂΜL (ref 1.85–7.62)
NEUTS SEG NFR BLD AUTO: 67 % (ref 43–75)
NRBC BLD AUTO-RTO: 0 /100 WBCS
PLATELET # BLD AUTO: 185 THOUSANDS/UL (ref 149–390)
PMV BLD AUTO: 11.5 FL (ref 8.9–12.7)
POTASSIUM SERPL-SCNC: 4 MMOL/L (ref 3.5–5.3)
PROT SERPL-MCNC: 7.2 G/DL (ref 6.4–8.4)
RBC # BLD AUTO: 5.09 MILLION/UL (ref 3.88–5.62)
SODIUM SERPL-SCNC: 141 MMOL/L (ref 135–147)
TRIGL SERPL-MCNC: 155 MG/DL
TSH SERPL DL<=0.05 MIU/L-ACNC: 4.76 UIU/ML (ref 0.45–4.5)
WBC # BLD AUTO: 7.48 THOUSAND/UL (ref 4.31–10.16)

## 2022-12-08 ENCOUNTER — OFFICE VISIT (OUTPATIENT)
Dept: INTERNAL MEDICINE CLINIC | Facility: CLINIC | Age: 57
End: 2022-12-08

## 2022-12-08 VITALS
WEIGHT: 186.2 LBS | OXYGEN SATURATION: 96 % | HEIGHT: 69 IN | HEART RATE: 74 BPM | TEMPERATURE: 98.3 F | DIASTOLIC BLOOD PRESSURE: 82 MMHG | SYSTOLIC BLOOD PRESSURE: 136 MMHG | BODY MASS INDEX: 27.58 KG/M2

## 2022-12-08 DIAGNOSIS — E78.2 MIXED HYPERLIPIDEMIA: ICD-10-CM

## 2022-12-08 DIAGNOSIS — E03.9 ACQUIRED HYPOTHYROIDISM: ICD-10-CM

## 2022-12-08 DIAGNOSIS — K21.9 GASTROESOPHAGEAL REFLUX DISEASE WITHOUT ESOPHAGITIS: ICD-10-CM

## 2022-12-08 DIAGNOSIS — C14.0 THROAT CANCER (HCC): ICD-10-CM

## 2022-12-08 DIAGNOSIS — Z23 NEEDS FLU SHOT: ICD-10-CM

## 2022-12-08 DIAGNOSIS — E11.9 TYPE 2 DIABETES, HBA1C GOAL < 7% (HCC): Primary | ICD-10-CM

## 2022-12-08 RX ORDER — METFORMIN HYDROCHLORIDE 500 MG/1
1000 TABLET, EXTENDED RELEASE ORAL
Qty: 180 TABLET | Refills: 1 | Status: SHIPPED | OUTPATIENT
Start: 2022-12-08 | End: 2022-12-08

## 2022-12-08 NOTE — PROGRESS NOTES
Assessment/Plan:      Tobacco Cessation Counseling: Tobacco cessation counseling was provided  The patient is sincerely urged to quit consumption of tobacco  He is ready to quit tobacco              1  Type 2 diabetes, HbA1c goal < 7% ([facility])  Comments:  wants to try diet and exercise, wants to wait on medication  Orders:  -     Hemoglobin A1C; Future; Expected date: 03/08/2023  -     Microalbumin / creatinine urine ratio; Future; Expected date: 03/08/2023  -     Ambulatory referral to Diabetic Education; Future; Expected date: 12/08/2022    2  Acquired hypothyroidism    3  Throat cancer ([facility] )    4  Mixed hyperlipidemia    5  Gastroesophageal reflux disease without esophagitis         Subjective:      Patient ID: Allie Singh is a 59 y o  male  Follow up on blood test 1/23/2022 discussed with him      The following portions of the patient's history were reviewed and updated as appropriate: He  has a past medical history of GERD (gastroesophageal reflux disease), Hepatitis C, Hypertriglyceridemia, Impaired fasting glucose, and Throat cancer ([facility] )  He   Patient Active Problem List    Diagnosis Date Noted   • Type 2 diabetes, HbA1c goal < 7% ([facility] ) 12/08/2022   • COVID-19 virus detected 11/10/2022   • Plantar fasciitis, right 10/17/2022   • Tobacco use 03/17/2021   • Acquired hypothyroidism 01/26/2021   • Mixed hyperlipidemia 01/26/2021   • Throat cancer ([facility] )    • Impaired fasting glucose    • Hypertriglyceridemia    • GERD (gastroesophageal reflux disease)    • Chronic hepatitis C without hepatic coma ([facility] )      He  has a past surgical history that includes Knee surgery; Replacement total knee (Right); PEG tube placement; PEG tube removal; TRACHEOSTOMY; and Multiple tooth extractions  His family history includes Heart disease in his father; Lung cancer in his sister  He  reports that he has been smoking cigarettes  He has a 8 75 pack-year smoking history   He has never used smokeless tobacco  He reports current alcohol use of about 1 0 standard drink per week  He reports that he does not use drugs  Current Outpatient Medications   Medication Sig Dispense Refill   • atorvastatin (LIPITOR) 40 mg tablet Take 1 tablet (40 mg total) by mouth daily 90 tablet 1   • omeprazole (PriLOSEC) 40 MG capsule Take 1 capsule (40 mg total) by mouth daily 90 capsule 1   • Synthroid 88 MCG tablet Take 1 tablet (88 mcg total) by mouth daily 90 tablet 1     No current facility-administered medications for this visit  Current Outpatient Medications on File Prior to Visit   Medication Sig   • atorvastatin (LIPITOR) 40 mg tablet Take 1 tablet (40 mg total) by mouth daily   • omeprazole (PriLOSEC) 40 MG capsule Take 1 capsule (40 mg total) by mouth daily   • Synthroid 88 MCG tablet Take 1 tablet (88 mcg total) by mouth daily     No current facility-administered medications on file prior to visit  He is allergic to penicillins       Review of Systems   Constitutional: Negative for chills and fever  HENT: Negative for congestion, ear pain and sore throat  Eyes: Negative for pain  Respiratory: Positive for cough  Negative for shortness of breath  Cardiovascular: Negative for chest pain and leg swelling  Gastrointestinal: Negative for abdominal pain, nausea and vomiting  Endocrine: Negative for polyuria  Genitourinary: Negative for difficulty urinating, frequency and urgency  Musculoskeletal: Negative for arthralgias and back pain  Skin: Negative for rash  Neurological: Negative for weakness and headaches  Psychiatric/Behavioral: Negative for sleep disturbance  The patient is not nervous/anxious            Objective:      /82 (BP Location: Left arm, Patient Position: Sitting, Cuff Size: Large)   Pulse 74   Temp 98 3 °F (36 8 °C) (Temporal)   Ht 5' 9" (1 753 m)   Wt 84 5 kg (186 lb 3 2 oz)   SpO2 96%   BMI 27 50 kg/m²     Recent Results (from the past 1344 hour(s))   CBC and differential    Collection Time: 11/23/22  8:18 AM   Result Value Ref Range    WBC 7 48 4 31 - 10 16 Thousand/uL    RBC 5 09 3 88 - 5 62 Million/uL    Hemoglobin 16 4 12 0 - 17 0 g/dL    Hematocrit 46 1 36 5 - 49 3 %    MCV 91 82 - 98 fL    MCH 32 2 26 8 - 34 3 pg    MCHC 35 6 31 4 - 37 4 g/dL    RDW 11 6 11 6 - 15 1 %    MPV 11 5 8 9 - 12 7 fL    Platelets 767 012 - 237 Thousands/uL    nRBC 0 /100 WBCs    Neutrophils Relative 67 43 - 75 %    Immat GRANS % 0 0 - 2 %    Lymphocytes Relative 21 14 - 44 %    Monocytes Relative 9 4 - 12 %    Eosinophils Relative 2 0 - 6 %    Basophils Relative 1 0 - 1 %    Neutrophils Absolute 4 98 1 85 - 7 62 Thousands/µL    Immature Grans Absolute 0 02 0 00 - 0 20 Thousand/uL    Lymphocytes Absolute 1 56 0 60 - 4 47 Thousands/µL    Monocytes Absolute 0 69 0 17 - 1 22 Thousand/µL    Eosinophils Absolute 0 17 0 00 - 0 61 Thousand/µL    Basophils Absolute 0 06 0 00 - 0 10 Thousands/µL   Comprehensive metabolic panel    Collection Time: 11/23/22  8:18 AM   Result Value Ref Range    Sodium 141 135 - 147 mmol/L    Potassium 4 0 3 5 - 5 3 mmol/L    Chloride 109 (H) 96 - 108 mmol/L    CO2 26 21 - 32 mmol/L    ANION GAP 6 4 - 13 mmol/L    BUN 17 5 - 25 mg/dL    Creatinine 0 81 0 60 - 1 30 mg/dL    Glucose, Fasting 148 (H) 65 - 99 mg/dL    Calcium 9 6 8 3 - 10 1 mg/dL    AST 15 5 - 45 U/L    ALT 38 12 - 78 U/L    Alkaline Phosphatase 83 46 - 116 U/L    Total Protein 7 2 6 4 - 8 4 g/dL    Albumin 3 9 3 5 - 5 0 g/dL    Total Bilirubin 0 62 0 20 - 1 00 mg/dL    eGFR 97 ml/min/1 73sq m   Hemoglobin A1C    Collection Time: 11/23/22  8:18 AM   Result Value Ref Range    Hemoglobin A1C 7 7 (H) Normal 3 8-5 6%; PreDiabetic 5 7-6 4%;  Diabetic >=6 5%; Glycemic control for adults with diabetes <7 0% %     mg/dl   Lipid Panel with Direct LDL reflex    Collection Time: 11/23/22  8:18 AM   Result Value Ref Range    Cholesterol 123 See Comment mg/dL    Triglycerides 155 (H) See Comment mg/dL    HDL, Direct 33 (L) >=40 mg/dL LDL Calculated 59 0 - 100 mg/dL   TSH, 3rd generation    Collection Time: 11/23/22  8:18 AM   Result Value Ref Range    TSH 3RD GENERATON 4 760 (H) 0 450 - 4 500 uIU/mL        Physical Exam  Constitutional:       Appearance: Normal appearance  HENT:      Head: Normocephalic  Right Ear: Tympanic membrane, ear canal and external ear normal       Left Ear: Tympanic membrane, ear canal and external ear normal       Nose: Nose normal  No congestion  Mouth/Throat:      Mouth: Mucous membranes are moist       Pharynx: Oropharynx is clear  No oropharyngeal exudate or posterior oropharyngeal erythema  Eyes:      Extraocular Movements: Extraocular movements intact  Conjunctiva/sclera: Conjunctivae normal       Pupils: Pupils are equal, round, and reactive to light  Cardiovascular:      Rate and Rhythm: Normal rate and regular rhythm  Heart sounds: Normal heart sounds  No murmur heard  Pulmonary:      Effort: Pulmonary effort is normal       Breath sounds: Normal breath sounds  No wheezing or rales  Abdominal:      General: Bowel sounds are normal  There is no distension  Palpations: Abdomen is soft  Tenderness: There is no abdominal tenderness  Musculoskeletal:         General: Normal range of motion  Cervical back: Normal range of motion and neck supple  Right lower leg: No edema  Left lower leg: No edema  Lymphadenopathy:      Cervical: No cervical adenopathy  Skin:     General: Skin is warm  Neurological:      General: No focal deficit present  Mental Status: He is alert and oriented to person, place, and time

## 2022-12-12 ENCOUNTER — OFFICE VISIT (OUTPATIENT)
Dept: PODIATRY | Facility: CLINIC | Age: 57
End: 2022-12-12

## 2022-12-12 VITALS — WEIGHT: 187 LBS | BODY MASS INDEX: 27.7 KG/M2 | HEIGHT: 69 IN

## 2022-12-12 DIAGNOSIS — M72.2 PLANTAR FASCIITIS, RIGHT: Primary | ICD-10-CM

## 2022-12-12 NOTE — PATIENT INSTRUCTIONS
Plantar Fasciitis Exercises   WHAT YOU NEED TO KNOW:   Plantar fasciitis exercises help stretch your plantar fascia, calf muscles, and Achilles tendon  They also help strengthen the muscles that support your heel and foot  Exercises and stretching can help prevent plantar fasciitis from getting worse or coming back  DISCHARGE INSTRUCTIONS:   Contact your healthcare provider if:   Your pain and swelling increase  You develop new knee, hip, or back pain  You have questions or concerns about your condition or care  How to do plantar fasciitis exercises:  Ask your healthcare provider when to start these exercises and how often to do them  Slant board stretch:  Stand on a slanted board with your toes higher than your heel  Press your heel into the board  Keep your knee slightly bent  Hold this position for 1 minute  Repeat 5 times  Heel stretch:  Stand up straight with your hands on a wall  Place your injured leg slightly behind your other leg  Keep your heels flat on the floor, lean forward, and bend both knees  Hold for 30 seconds  Calf stretch:  Stand up straight with your hands on a wall  Step forward so that your uninjured foot is in front of your injured foot  Keep your front leg bent and your back leg straight  Gently lean forward until you feel your calf stretch  Hold for 30 seconds and then relax  Seated plantar fascia stretch:  Sit on a firm surface, such as the floor or a mat  Extend your legs out in front of you  Raise your injured foot a few inches off the ground  Keep your leg straight  Grab the toes of your injured foot and pull them toward you  With your other hand, feel your plantar fascia  You should feel it press outward  Hold for 30 seconds  If you cannot reach your toes, loop a towel or tie around your foot  Gently pull on the towel or tie and flex your toes toward you  Heel raises:  Stand on the injured leg  Raise your other leg off the ground   Hold onto a railing or wall for balance  Slowly rise up on the toes of your injured leg  Hold for 5 seconds  Slowly lower your heel to the ground  Toe curls:  Place a towel on the floor  Put your foot flat on the towel  Grab the towel with your toes by curling them around the towel  Lift the towel up with your toes  Toe taps:  Sit down and place your foot flat on the floor  Keep your heel on the floor  Point all your toes up toward the ceiling  While the 4 smaller toes are pointed up, bend your big toe down and tap it on the ground  Do 10 to 50 taps  Point all 5 toes up toward the ceiling again  This time keep your big toe pointed up and tap the 4 smaller toes on the ground  Do 10 to 50 taps each time  Follow up with your doctor as directed:  Write down your questions so you remember to ask them during your visits  © Copyright [employer] 2022 Information is for End User's use only and may not be sold, redistributed or otherwise used for commercial purposes  All illustrations and images included in CareNotes® are the copyrighted property of A D A M , Inc  or [employer]   The above information is an  only  It is not intended as medical advice for individual conditions or treatments  Talk to your doctor, nurse or pharmacist before following any medical regimen to see if it is safe and effective for you

## 2022-12-12 NOTE — PROGRESS NOTES
This patient was seen on 12/12/22  My role is Foot , Ankle, and Wound Specialist    SUBJECTIVE    Chief Complaint:  Heel pain     Patient ID: Dharmesh Castellon is a 59 y o  male  Si Ninfa is here for heel pain  He notes pain in the heel and arch, particularly when getting up from rest  He's been sporadically stretching the foot  He denies walking at home without shoes  He did get a new pair of work shoes that he thought would help but it did not  The following portions of the patient's history were reviewed and updated as appropriate: allergies, current medications, past family history, past medical history, past social history, past surgical history and problem list     Review of Systems   Constitutional: Negative  Respiratory: Negative  Musculoskeletal: Positive for arthralgias, gait problem and myalgias  OBJECTIVE      Ht 5' 9" (1 753 m)   Wt 84 8 kg (187 lb)   BMI 27 62 kg/m²     Foot/Ankle Musculoskeletal Exam    General    Neurological: alert  General additional comments:  I note muscle function of the anterior, posterior, evertor and invertor muscle groups of the lower leg are intact and normal  I note ROM of the ankle joint, subtalar joint, Choparts and Lisfranc's joint complexes and metatarsophalangeal joints are WNL without crepitus nor restrictions bilaterally  I note pain on palpation of the plantar-medial calcaneal tubercle at the fascial origin  Physical Exam  Vitals and nursing note reviewed  Constitutional:       General: He is not in acute distress  Appearance: Normal appearance  He is not ill-appearing, toxic-appearing or diaphoretic  HENT:      Head: Normocephalic and atraumatic  Pulmonary:      Effort: Pulmonary effort is normal       Breath sounds: Normal breath sounds  Musculoskeletal:         General: Tenderness present        Comments:  I note muscle function of the anterior, posterior, evertor and invertor muscle groups of the lower leg are intact and normal  I note ROM of the ankle joint, subtalar joint, Choparts and Lisfranc's joint complexes and metatarsophalangeal joints are WNL without crepitus nor restrictions bilaterally  I note pain on palpation of the plantar-medial calcaneal tubercle at the fascial origin  Skin:     General: Skin is warm  Capillary Refill: Capillary refill takes less than 2 seconds  Neurological:      General: No focal deficit present  Mental Status: He is alert  Psychiatric:         Mood and Affect: Mood normal              ASSESSMENT     Diagnoses and all orders for this visit:    Plantar fasciitis, right  -     Ambulatory referral to Physical Therapy; Future         Problem List Items Addressed This Visit        Musculoskeletal and Integument    Plantar fasciitis, right - Primary    Relevant Orders    Ambulatory referral to Physical Therapy           PLAN    I recommended he continue three times a day ice application to the heel and stretching exercises taught  I recommended he continue to abstain from walking without a supportive shoe in the house  I discussed proper shoegear (a cross- type sneaker or workboot that is in good repair)  I recommended an injection of the heel and the patient declined this  I recommended he see PT for a pair of custom orthotics and he's willing to do that  He's going to follow-up with me after getting them

## 2022-12-13 DIAGNOSIS — E78.2 MIXED HYPERLIPIDEMIA: ICD-10-CM

## 2022-12-14 RX ORDER — ATORVASTATIN CALCIUM 40 MG/1
40 TABLET, FILM COATED ORAL DAILY
Qty: 90 TABLET | Refills: 1 | Status: SHIPPED | OUTPATIENT
Start: 2022-12-14

## 2022-12-15 NOTE — TELEPHONE ENCOUNTER
Last seen 12/8/22, next appt is 3/6/23   Refills needed 5-Fu Counseling: 5-Fluorouracil Counseling:  I discussed with the patient the risks of 5-fluorouracil including but not limited to erythema, scaling, itching, weeping, crusting, and pain.

## 2023-02-02 LAB
LEFT EYE DIABETIC RETINOPATHY: NORMAL
RIGHT EYE DIABETIC RETINOPATHY: NORMAL

## 2023-02-23 ENCOUNTER — RA CDI HCC (OUTPATIENT)
Dept: OTHER | Facility: HOSPITAL | Age: 58
End: 2023-02-23

## 2023-02-23 NOTE — PROGRESS NOTES
[facility]  coding opportunities       Chart reviewed, no opportunity found: CHART REVIEWED, NO OPPORTUNITY FOUND        Patients Insurance        Commercial Insurance: [address] [place] [address]

## 2023-04-03 ENCOUNTER — TELEMEDICINE (OUTPATIENT)
Dept: INTERNAL MEDICINE CLINIC | Facility: CLINIC | Age: 58
End: 2023-04-03

## 2023-04-03 VITALS — WEIGHT: 187 LBS | HEIGHT: 69 IN | BODY MASS INDEX: 27.7 KG/M2

## 2023-04-03 DIAGNOSIS — J06.9 UPPER RESPIRATORY TRACT INFECTION, UNSPECIFIED TYPE: Primary | ICD-10-CM

## 2023-04-03 RX ORDER — AZITHROMYCIN 250 MG/1
TABLET, FILM COATED ORAL
Qty: 6 TABLET | Refills: 0 | Status: SHIPPED | OUTPATIENT
Start: 2023-04-03 | End: 2023-04-08

## 2023-04-03 NOTE — PROGRESS NOTES
Virtual Brief Visit    Patient is located in the following state in which I hold an active license [place]      Assessment/Plan:    Problem List Items Addressed This Visit    None  Visit Diagnoses     Upper respiratory tract infection, unspecified type    -  Primary    Relevant Medications    azithromycin (Zithromax) 250 mg tablet          Complains of productive cough, low-grade fevers, chills since last Friday  Home COVID test negative  Denies chest pain, shortness of breath, wheezing, nausea vomiting, had a few episodes of diarrhea which is improved  Discussed supportive management, encourage hydration, follow-up if symptoms are not improved in the next few days    Recent Visits  No visits were found meeting these conditions  Showing recent visits within past 7 days and meeting all other requirements  Today's Visits  Date Type Provider Dept   04/03/23 Telemedicine Afshan Rendon MD [facility]   Showing today's visits and meeting all other requirements  Future Appointments  No visits were found meeting these conditions    Showing future appointments within next 150 days and meeting all other requirements         Visit Time    Visit Start Time:   Visit Stop Time:   Total Visit Duration: 15 minutes

## 2023-04-04 ENCOUNTER — OFFICE VISIT (OUTPATIENT)
Dept: URGENT CARE | Facility: CLINIC | Age: 58
End: 2023-04-04

## 2023-04-04 VITALS
DIASTOLIC BLOOD PRESSURE: 86 MMHG | OXYGEN SATURATION: 96 % | RESPIRATION RATE: 16 BRPM | HEART RATE: 80 BPM | TEMPERATURE: 97 F | SYSTOLIC BLOOD PRESSURE: 168 MMHG

## 2023-04-04 DIAGNOSIS — H57.11 EYE PAIN, RIGHT: Primary | ICD-10-CM

## 2023-04-04 RX ORDER — POLYMYXIN B SULFATE AND TRIMETHOPRIM 1; 10000 MG/ML; [USP'U]/ML
1 SOLUTION OPHTHALMIC EVERY 4 HOURS
Qty: 3 ML | Refills: 0 | Status: SHIPPED | OUTPATIENT
Start: 2023-04-04 | End: 2023-04-11

## 2023-04-04 NOTE — PROGRESS NOTES
[facility]        NAME: Bassam Anderson is a 59 y o  male  : 1965    MRN: [id number]  DATE: 2023  TIME: 2:14 PM    Assessment and Plan   Eye pain, right [H57 11]  1  Eye pain, right          - Florescean stain revealed no abrasion     Patient Instructions   - Take ABX as prescribed  - Follow up with opthalmology  Follow up with PCP in 3-5 days  Proceed to  ER if symptoms worsen  Chief Complaint     Chief Complaint   Patient presents with   • Eye Pain     Pt reports right eye pain starting yesterday, believes he may have gotten something in his eye either yesterday or   History of Present Illness       60 y/o M presents for right eye pain x 2 days  Pt believes he may have gotten something in his eye  Unsure of what it could be  Pt seen yesterday via telemedicine, dx with URI, and prescribed z-brady  Review of Systems   Review of Systems   Eyes: Positive for pain, discharge and redness  Negative for photophobia, itching and visual disturbance  Current Medications       Current Outpatient Medications:   •  atorvastatin (LIPITOR) 40 mg tablet, Take 1 tablet (40 mg total) by mouth daily, Disp: 90 tablet, Rfl: 1  •  azithromycin (Zithromax) 250 mg tablet, Take 2 tablets (500 mg total) by mouth daily for 1 day, THEN 1 tablet (250 mg total) daily for 4 days  , Disp: 6 tablet, Rfl: 0  •  omeprazole (PriLOSEC) 40 MG capsule, Take 1 capsule (40 mg total) by mouth daily, Disp: 90 capsule, Rfl: 1  •  Synthroid 88 MCG tablet, Take 1 tablet (88 mcg total) by mouth daily, Disp: 90 tablet, Rfl: 1    Current Allergies     Allergies as of 2023 - Reviewed 2023   Allergen Reaction Noted   • Penicillins  2012            The following portions of the patient's history were reviewed and updated as appropriate: allergies, current medications, past family history, past medical history, past social history, past surgical history and problem list      Past Medical History:   Diagnosis Date   • GERD (gastroesophageal reflux disease)    • Hepatitis C    • Hypertriglyceridemia    • Impaired fasting glucose    • Throat cancer [facility])        Past Surgical History:   Procedure Laterality Date   • KNEE SURGERY     • MULTIPLE TOOTH EXTRACTIONS     • PEG TUBE PLACEMENT     • PEG TUBE REMOVAL     • REPLACEMENT TOTAL KNEE Right    • TRACHEOSTOMY         Family History   Problem Relation Age of Onset   • Heart disease Father    • Lung cancer Sister          Medications have been verified  Objective   /86   Pulse 80   Temp (!) 97 °F (36 1 °C)   Resp 16   SpO2 96%   No LMP for male patient  Physical Exam     Physical Exam  Vitals and nursing note reviewed  Constitutional:       General: He is not in acute distress  Appearance: He is not toxic-appearing  HENT:      Head: Normocephalic and atraumatic  Eyes:      Extraocular Movements: Extraocular movements intact  Pupils: Pupils are equal, round, and reactive to light  Comments: Right eye redness  EOM intact  No purulent discharge  Pin-point cyst on medial aspect of lower lid, but has been there for a while  No crusting  Neurological:      Mental Status: He is alert

## 2023-04-06 DIAGNOSIS — E03.9 ACQUIRED HYPOTHYROIDISM: ICD-10-CM

## 2023-04-06 DIAGNOSIS — E78.2 MIXED HYPERLIPIDEMIA: ICD-10-CM

## 2023-04-06 DIAGNOSIS — K21.9 GASTROESOPHAGEAL REFLUX DISEASE WITHOUT ESOPHAGITIS: ICD-10-CM

## 2023-04-07 ENCOUNTER — APPOINTMENT (OUTPATIENT)
Dept: LAB | Facility: CLINIC | Age: 58
End: 2023-04-07

## 2023-04-07 DIAGNOSIS — E11.9 TYPE 2 DIABETES, HBA1C GOAL < 7% (HCC): ICD-10-CM

## 2023-04-07 LAB
EST. AVERAGE GLUCOSE BLD GHB EST-MCNC: 148 MG/DL
HBA1C MFR BLD: 6.8 %

## 2023-04-07 RX ORDER — ATORVASTATIN CALCIUM 40 MG/1
40 TABLET, FILM COATED ORAL DAILY
Qty: 90 TABLET | Refills: 0 | Status: SHIPPED | OUTPATIENT
Start: 2023-04-07

## 2023-04-07 RX ORDER — OMEPRAZOLE 40 MG/1
40 CAPSULE, DELAYED RELEASE ORAL DAILY
Qty: 90 CAPSULE | Refills: 0 | Status: SHIPPED | OUTPATIENT
Start: 2023-04-07

## 2023-04-07 RX ORDER — LEVOTHYROXINE SODIUM 88 MCG
88 TABLET ORAL DAILY
Qty: 90 TABLET | Refills: 0 | Status: SHIPPED | OUTPATIENT
Start: 2023-04-07

## 2023-04-26 ENCOUNTER — OFFICE VISIT (OUTPATIENT)
Dept: INTERNAL MEDICINE CLINIC | Facility: CLINIC | Age: 58
End: 2023-04-26

## 2023-04-26 VITALS
TEMPERATURE: 98.4 F | HEIGHT: 69 IN | BODY MASS INDEX: 26.07 KG/M2 | DIASTOLIC BLOOD PRESSURE: 88 MMHG | OXYGEN SATURATION: 97 % | SYSTOLIC BLOOD PRESSURE: 140 MMHG | HEART RATE: 75 BPM | WEIGHT: 176 LBS

## 2023-04-26 DIAGNOSIS — J02.9 PHARYNGITIS, UNSPECIFIED ETIOLOGY: Primary | ICD-10-CM

## 2023-04-26 RX ORDER — DOXYCYCLINE HYCLATE 100 MG/1
100 CAPSULE ORAL EVERY 12 HOURS SCHEDULED
Qty: 10 CAPSULE | Refills: 0 | Status: SHIPPED | OUTPATIENT
Start: 2023-04-26 | End: 2023-05-01

## 2023-04-26 NOTE — PROGRESS NOTES
Assessment/Plan:    Diagnoses and all orders for this visit:    Pharyngitis, unspecified etiology  -     doxycycline hyclate (VIBRAMYCIN) 100 mg capsule; Take 1 capsule (100 mg total) by mouth every 12 (twelve) hours for 5 days       Supportive management, call back as needed           There are no Patient Instructions on file for this visit  Subjective:      Patient ID: Dharmesh gleason a 59 y o  male    Complains of a sore throat, cough with yellow sputum over the weekend  Denies any chest pain nausea vomiting diarrhea        Current Outpatient Medications:   •  atorvastatin (LIPITOR) 40 mg tablet, Take 1 tablet (40 mg total) by mouth daily, Disp: 90 tablet, Rfl: 0  •  doxycycline hyclate (VIBRAMYCIN) 100 mg capsule, Take 1 capsule (100 mg total) by mouth every 12 (twelve) hours for 5 days, Disp: 10 capsule, Rfl: 0  •  omeprazole (PriLOSEC) 40 MG capsule, Take 1 capsule (40 mg total) by mouth daily, Disp: 90 capsule, Rfl: 0  •  Synthroid 88 MCG tablet, Take 1 tablet (88 mcg total) by mouth daily, Disp: 90 tablet, Rfl: 0     Past Medical History:   Diagnosis Date   • GERD (gastroesophageal reflux disease)    • Hepatitis C    • Hypertriglyceridemia    • Impaired fasting glucose    • Throat cancer (HCC)          Past Surgical History:   Procedure Laterality Date   • KNEE SURGERY     • MULTIPLE TOOTH EXTRACTIONS     • PEG TUBE PLACEMENT     • PEG TUBE REMOVAL     • REPLACEMENT TOTAL KNEE Right    • TRACHEOSTOMY           Allergies   Allergen Reactions   • Penicillins        Recent Results (from the past 1008 hour(s))   Hemoglobin A1C    Collection Time: 04/07/23  7:33 AM   Result Value Ref Range    Hemoglobin A1C 6 8 (H) Normal 3 8-5 6%; PreDiabetic 5 7-6 4%;  Diabetic >=6 5%; Glycemic control for adults with diabetes <7 0% %     mg/dl       The following portions of the patient's history were reviewed and updated as appropriate: allergies, current medications, past family history, past medical history, past social history, past surgical history and problem list      Review of Systems   Constitutional: Negative for activity change, appetite change, chills, diaphoresis, fatigue, fever and unexpected weight change  HENT: Positive for congestion  Negative for drooling, ear discharge, ear pain, facial swelling, hearing loss, postnasal drip, rhinorrhea, sinus pressure, sinus pain, sneezing, sore throat, tinnitus, trouble swallowing and voice change  Eyes: Negative for discharge  Respiratory: Positive for cough  Negative for apnea, choking, chest tightness, shortness of breath, wheezing and stridor  Cardiovascular: Negative for chest pain, palpitations and leg swelling  Gastrointestinal: Negative for abdominal distention, abdominal pain, blood in stool, constipation, diarrhea, nausea, rectal pain and vomiting  Genitourinary: Negative for dysuria, flank pain, frequency and urgency  Musculoskeletal: Negative for arthralgias, back pain, gait problem, joint swelling and myalgias  Skin: Negative for color change, pallor and rash  Neurological: Negative for dizziness and headaches  Objective:      Vitals:    04/26/23 1101   BP: 140/88   Pulse: 75   Temp: 98 4 °F (36 9 °C)   SpO2: 97%          Physical Exam  Vitals reviewed  Constitutional:       General: He is not in acute distress  Appearance: Normal appearance  He is not ill-appearing, toxic-appearing or diaphoretic  HENT:      Mouth/Throat:      Mouth: Mucous membranes are moist       Pharynx: Posterior oropharyngeal erythema present  No pharyngeal swelling, oropharyngeal exudate or uvula swelling  Cardiovascular:      Rate and Rhythm: Normal rate and regular rhythm  Pulses: Normal pulses  no weak pulses          Dorsalis pedis pulses are 2+ on the right side and 2+ on the left side  Heart sounds: Normal heart sounds  No murmur heard  No friction rub  No gallop     Pulmonary:      Effort: Pulmonary effort is normal  No respiratory distress  Breath sounds: Normal breath sounds  No stridor  No wheezing, rhonchi or rales  Chest:      Chest wall: No tenderness  Musculoskeletal:      Right lower leg: No edema  Left lower leg: No edema  Feet:    Feet:      Right foot:      Skin integrity: No ulcer, skin breakdown, erythema, warmth, callus or dry skin  Left foot:      Skin integrity: No ulcer, skin breakdown, erythema, warmth, callus or dry skin  Skin:     General: Skin is warm and dry  Findings: No lesion or rash  Neurological:      Mental Status: He is alert  Diabetic Foot Exam    Patient's shoes and socks removed  Right Foot/Ankle   Right Foot Inspection  Skin Exam: skin normal and skin intact  No dry skin, no warmth, no callus, no erythema, no maceration, no abnormal color, no pre-ulcer, no ulcer and no callus  Toe Exam: ROM and strength within normal limits  Sensory   Monofilament testing: intact    Vascular  The right DP pulse is 2+  Left Foot/Ankle  Left Foot Inspection  Skin Exam: skin normal and skin intact  No dry skin, no warmth, no erythema, no maceration, normal color, no pre-ulcer, no ulcer and no callus  Toe Exam: ROM and strength within normal limits  Sensory   Monofilament testing: intact    Vascular  The left DP pulse is 2+       Assign Risk Category  No deformity present  No loss of protective sensation  No weak pulses  Risk: 0

## 2023-05-15 ENCOUNTER — OFFICE VISIT (OUTPATIENT)
Dept: INTERNAL MEDICINE CLINIC | Facility: CLINIC | Age: 58
End: 2023-05-15

## 2023-05-15 VITALS
HEIGHT: 69 IN | DIASTOLIC BLOOD PRESSURE: 84 MMHG | SYSTOLIC BLOOD PRESSURE: 136 MMHG | HEART RATE: 79 BPM | BODY MASS INDEX: 25.77 KG/M2 | OXYGEN SATURATION: 97 % | WEIGHT: 174 LBS | TEMPERATURE: 98.1 F

## 2023-05-15 DIAGNOSIS — J01.00 ACUTE MAXILLARY SINUSITIS, RECURRENCE NOT SPECIFIED: Primary | ICD-10-CM

## 2023-05-15 DIAGNOSIS — E03.9 ACQUIRED HYPOTHYROIDISM: ICD-10-CM

## 2023-05-15 DIAGNOSIS — E78.2 MIXED HYPERLIPIDEMIA: ICD-10-CM

## 2023-05-15 DIAGNOSIS — C14.0 THROAT CANCER (HCC): ICD-10-CM

## 2023-05-15 DIAGNOSIS — K21.9 GASTROESOPHAGEAL REFLUX DISEASE WITHOUT ESOPHAGITIS: ICD-10-CM

## 2023-05-15 DIAGNOSIS — E11.9 TYPE 2 DIABETES, HBA1C GOAL < 7% (HCC): ICD-10-CM

## 2023-05-15 RX ORDER — OMEPRAZOLE 40 MG/1
40 CAPSULE, DELAYED RELEASE ORAL DAILY
Qty: 90 CAPSULE | Refills: 1 | Status: SHIPPED | OUTPATIENT
Start: 2023-05-15

## 2023-05-15 RX ORDER — DOXYCYCLINE HYCLATE 100 MG/1
100 CAPSULE ORAL EVERY 12 HOURS SCHEDULED
Qty: 14 CAPSULE | Refills: 0 | Status: SHIPPED | OUTPATIENT
Start: 2023-05-15 | End: 2023-05-22

## 2023-05-15 RX ORDER — ATORVASTATIN CALCIUM 40 MG/1
40 TABLET, FILM COATED ORAL DAILY
Qty: 90 TABLET | Refills: 1 | Status: SHIPPED | OUTPATIENT
Start: 2023-05-15

## 2023-05-15 RX ORDER — LEVOTHYROXINE SODIUM 88 MCG
88 TABLET ORAL DAILY
Qty: 90 TABLET | Refills: 1 | Status: SHIPPED | OUTPATIENT
Start: 2023-05-15

## 2023-05-15 NOTE — PROGRESS NOTES
Assessment/Plan:    BMI Counseling: Body mass index is 25 7 kg/m²  The BMI is above normal  Nutrition recommendations include decreasing portion sizes, encouraging healthy choices of fruits and vegetables and decreasing fast food intake  Exercise recommendations include moderate physical activity 150 minutes/week  Rationale for BMI follow-up plan is due to patient being overweight or obese      he Would like to try diet and exercise for the elevated sugar, we will recheck the blood test in September, to see how he is doing, his A1c came down from 7 7 to6 8, improved        1  Acute maxillary sinusitis, recurrence not specified  -     doxycycline hyclate (VIBRAMYCIN) 100 mg capsule; Take 1 capsule (100 mg total) by mouth every 12 (twelve) hours for 7 days    2  Type 2 diabetes, HbA1c goal < 7% ([facility])  -     CBC and differential; Future  -     Comprehensive metabolic panel; Future  -     Hemoglobin A1C; Future  -     Lipid Panel with Direct LDL reflex; Future  -     Albumin / creatinine urine ratio    3  Acquired hypothyroidism  -     Synthroid 88 MCG tablet; Take 1 tablet (88 mcg total) by mouth daily    4  Mixed hyperlipidemia  -     atorvastatin (LIPITOR) 40 mg tablet; Take 1 tablet (40 mg total) by mouth daily    5  Gastroesophageal reflux disease without esophagitis  -     omeprazole (PriLOSEC) 40 MG capsule; Take 1 capsule (40 mg total) by mouth daily    6  Throat cancer ([facility])           Subjective:      Patient ID: Brook Boyd is a 59 y o  male  Cough, yellow sputum, sore throat, blood test done on 4/7/2023 test discussed with him,      The following portions of the patient's history were reviewed and updated as appropriate: He  has a past medical history of GERD (gastroesophageal reflux disease), Hepatitis C, Hypertriglyceridemia, Impaired fasting glucose, and [facility] ([facility] )    He   Patient Active Problem List    Diagnosis Date Noted   • Acute maxillary sinusitis 05/15/2023   • Type 2 diabetes, HbA1c goal < 7% ([facility] ) 12/08/2022   • COVID-19 virus detected 11/10/2022   • Plantar fasciitis, right 10/17/2022   • Tobacco use 03/17/2021   • Acquired hypothyroidism 01/26/2021   • Mixed hyperlipidemia 01/26/2021   • Throat cancer (HCC)    • Impaired fasting glucose    • Hypertriglyceridemia    • GERD (gastroesophageal reflux disease)    • Chronic [facility] C without hepatic coma [facility] )      He  has a past surgical history that includes Knee surgery; Replacement total knee (Right); PEG tube placement; PEG tube removal; TRACHEOSTOMY; and Multiple tooth extractions  His family history includes Heart disease in his father; Lung cancer in his sister  He  reports that he has been smoking cigarettes  He started smoking about 48 years ago  He has a 12 00 pack-year smoking history  He has never used smokeless tobacco  He reports that he does not currently use alcohol after a past usage of about 1 0 standard drink per week  He reports that he does not use drugs  Current Outpatient Medications   Medication Sig Dispense Refill   • atorvastatin (LIPITOR) 40 mg tablet Take 1 tablet (40 mg total) by mouth daily 90 tablet 1   • doxycycline hyclate (VIBRAMYCIN) 100 mg capsule Take 1 capsule (100 mg total) by mouth every 12 (twelve) hours for 7 days 14 capsule 0   • omeprazole (PriLOSEC) 40 MG capsule Take 1 capsule (40 mg total) by mouth daily 90 capsule 1   • Synthroid 88 MCG tablet Take 1 tablet (88 mcg total) by mouth daily 90 tablet 1     No current facility-administered medications for this visit  Current Outpatient Medications on File Prior to Visit   Medication Sig   • [DISCONTINUED] atorvastatin (LIPITOR) 40 mg tablet Take 1 tablet (40 mg total) by mouth daily   • [DISCONTINUED] omeprazole (PriLOSEC) 40 MG capsule Take 1 capsule (40 mg total) by mouth daily   • [DISCONTINUED] Synthroid 88 MCG tablet Take 1 tablet (88 mcg total) by mouth daily     No current facility-administered medications on file prior to visit  "    He is allergic to penicillins       Review of Systems   Constitutional: Negative for chills and fever  HENT: Positive for sore throat  Negative for congestion and ear pain  Eyes: Negative for pain  Respiratory: Positive for cough  Negative for shortness of breath  Cardiovascular: Negative for chest pain and leg swelling  Gastrointestinal: Negative for abdominal pain, nausea and vomiting  Endocrine: Negative for polyuria  Genitourinary: Negative for difficulty urinating, frequency and urgency  Musculoskeletal: Negative for arthralgias and back pain  Skin: Negative for rash  Neurological: Negative for weakness and headaches  Psychiatric/Behavioral: Negative for sleep disturbance  The patient is not nervous/anxious  Objective:      /84 (BP Location: Right arm, Patient Position: Sitting, Cuff Size: Standard)   Pulse 79   Temp 98 1 °F (36 7 °C) (Temporal)   Ht 5' 9\" (1 753 m)   Wt 78 9 kg (174 lb)   SpO2 97%   BMI 25 70 kg/m²     Recent Results (from the past 1344 hour(s))   Hemoglobin A1C    Collection Time: 04/07/23  7:33 AM   Result Value Ref Range    Hemoglobin A1C 6 8 (H) Normal 3 8-5 6%; PreDiabetic 5 7-6 4%; Diabetic >=6 5%; Glycemic control for adults with diabetes <7 0% %     mg/dl        Physical Exam  Constitutional:       Appearance: Normal appearance  HENT:      Head: Normocephalic  Right Ear: Tympanic membrane, ear canal and external ear normal       Left Ear: Tympanic membrane, ear canal and external ear normal       Nose: Nose normal  No congestion  Mouth/Throat:      Mouth: Mucous membranes are moist       Pharynx: Oropharynx is clear  No oropharyngeal exudate or posterior oropharyngeal erythema  Eyes:      Extraocular Movements: Extraocular movements intact  Conjunctiva/sclera: Conjunctivae normal    Cardiovascular:      Rate and Rhythm: Normal rate and regular rhythm  Heart sounds: Normal heart sounds   No murmur " heard   Pulmonary:      Effort: Pulmonary effort is normal       Breath sounds: Normal breath sounds  No wheezing or rales  Abdominal:      General: Bowel sounds are normal  There is no distension  Palpations: Abdomen is soft  Tenderness: There is no abdominal tenderness  Musculoskeletal:         General: Normal range of motion  Cervical back: Normal range of motion and neck supple  Right lower leg: No edema  Left lower leg: No edema  Lymphadenopathy:      Cervical: No cervical adenopathy  Skin:     General: Skin is warm  Neurological:      General: No focal deficit present  Mental Status: He is alert and oriented to person, place, and time

## 2023-07-14 PROBLEM — J01.00 ACUTE MAXILLARY SINUSITIS: Status: RESOLVED | Noted: 2023-05-15 | Resolved: 2023-07-14

## 2023-10-14 ENCOUNTER — APPOINTMENT (OUTPATIENT)
Dept: RADIOLOGY | Age: 58
End: 2023-10-14
Payer: COMMERCIAL

## 2023-10-14 ENCOUNTER — OFFICE VISIT (OUTPATIENT)
Dept: URGENT CARE | Age: 58
End: 2023-10-14
Payer: COMMERCIAL

## 2023-10-14 VITALS
RESPIRATION RATE: 18 BRPM | TEMPERATURE: 97.9 F | HEART RATE: 73 BPM | SYSTOLIC BLOOD PRESSURE: 140 MMHG | OXYGEN SATURATION: 97 % | DIASTOLIC BLOOD PRESSURE: 78 MMHG

## 2023-10-14 DIAGNOSIS — M79.675 PAIN OF TOE OF LEFT FOOT: ICD-10-CM

## 2023-10-14 DIAGNOSIS — M79.675 PAIN OF TOE OF LEFT FOOT: Primary | ICD-10-CM

## 2023-10-14 PROCEDURE — S9083 URGENT CARE CENTER GLOBAL: HCPCS | Performed by: STUDENT IN AN ORGANIZED HEALTH CARE EDUCATION/TRAINING PROGRAM

## 2023-10-14 PROCEDURE — G0382 LEV 3 HOSP TYPE B ED VISIT: HCPCS | Performed by: STUDENT IN AN ORGANIZED HEALTH CARE EDUCATION/TRAINING PROGRAM

## 2023-10-14 PROCEDURE — 73630 X-RAY EXAM OF FOOT: CPT

## 2023-10-14 RX ORDER — KETOCONAZOLE 20 MG/G
CREAM TOPICAL DAILY
Qty: 15 G | Refills: 0 | Status: SHIPPED | OUTPATIENT
Start: 2023-10-14

## 2023-10-15 NOTE — PROGRESS NOTES
[facility]        NAME: Helena Sutherland is a 59 y.o. male  : 1965    MRN: [id number]  DATE: 2023  TIME: 8:35 PM    Assessment and Plan   Pain of toe of left foot [M79.675]  1. Pain of toe of left foot  XR foot 3+ vw left    Ambulatory Referral to Podiatry    ketoconazole (NIZORAL) 2 % cream      No acute fracture at site of pain on my read. Offered fredrick tape, post op shoe for pain. Pt declines, would like to focus on tx fungal infection between toes 4 and 5. To use ketoconazole between toes daily for at least 1 week until symptoms resolve. If he continues with fungal rash between his toes or toe pain, he will follow-up with podiatry, has been seen by their office previously for plantar fasciitis. Patient Instructions       Follow up with PCP in 3-5 days. Proceed to  ER if symptoms worsen. Chief Complaint     Chief Complaint   Patient presents with    Toe Pain      L Pinky toe Pain that started 3 days ago. [place] Lorenzo No injury reported. Small amount of redness on above toe . Pain with or without bearing weight. History of Present Illness       Patient presents for 3 days of pain of his left fifth toe which started when he woke up in the morning. The pain can come on randomly at rest.  He does not necessarily think that it is worse with walking. Denies injury, increase in activity. No fever, chills, swelling, redness. No numbness, tingling. Review of Systems   Review of Systems   All other systems reviewed and are negative.         Current Medications       Current Outpatient Medications:     atorvastatin (LIPITOR) 40 mg tablet, Take 1 tablet (40 mg total) by mouth daily, Disp: 90 tablet, Rfl: 1    ketoconazole (NIZORAL) 2 % cream, Apply topically daily, Disp: 15 g, Rfl: 0    omeprazole (PriLOSEC) 40 MG capsule, Take 1 capsule (40 mg total) by mouth daily, Disp: 90 capsule, Rfl: 1    Synthroid 88 MCG tablet, Take 1 tablet (88 mcg total) by mouth daily, Disp: 90 tablet, Rfl: 1    Current Allergies     Allergies as of 10/14/2023 - Reviewed 10/14/2023   Allergen Reaction Noted    Penicillins  07/11/2012            The following portions of the patient's history were reviewed and updated as appropriate: allergies, current medications, past family history, past medical history, past social history, past surgical history and problem list.     Past Medical History:   Diagnosis Date    GERD (gastroesophageal reflux disease)     Hepatitis C     Hypertriglyceridemia     Impaired fasting glucose     Throat cancer ([address])        Past Surgical History:   Procedure Laterality Date    KNEE SURGERY      MULTIPLE TOOTH EXTRACTIONS      PEG TUBE PLACEMENT      PEG TUBE REMOVAL      REPLACEMENT TOTAL KNEE Right     TRACHEOSTOMY         Family History   Problem Relation Age of Onset    Heart disease Father     Lung cancer Sister          Medications have been verified. Objective   /78   Pulse 73   Temp 97.9 °F (36.6 °C) (Temporal)   Resp 18   SpO2 97%   No LMP for male patient. Physical Exam     Physical Exam  Vitals and nursing note reviewed. Constitutional:       General: He is not in acute distress. Appearance: Normal appearance. He is not ill-appearing or toxic-appearing. HENT:      Head: Normocephalic and atraumatic. Right Ear: External ear normal.      Left Ear: External ear normal.      Nose: Nose normal.      Mouth/Throat:      Mouth: Mucous membranes are moist.   Eyes:      Extraocular Movements: Extraocular movements intact. Cardiovascular:      Rate and Rhythm: Normal rate and regular rhythm. Heart sounds: Normal heart sounds. Pulmonary:      Effort: Pulmonary effort is normal. No respiratory distress. Breath sounds: Normal breath sounds. No stridor. No wheezing, rhonchi or rales. Musculoskeletal:         General: Tenderness present. No swelling or deformity. Right lower leg: No edema. Left lower leg: No edema. Comments: Mild ttp about medial 5th toe   Skin:     General: Skin is warm and dry. Capillary Refill: Capillary refill takes less than 2 seconds. Findings: Rash present. Comments: Maceration between left toes 4 and 5   Neurological:      Mental Status: He is alert.       Gait: Gait normal.   Psychiatric:         Behavior: Behavior normal.

## 2023-10-16 ENCOUNTER — TELEPHONE (OUTPATIENT)
Dept: URGENT CARE | Age: 58
End: 2023-10-16

## 2023-10-16 NOTE — TELEPHONE ENCOUNTER
Left VM for pt confirming radiology read of his foot, no acute fracture. May call back with any questions.

## 2023-11-04 ENCOUNTER — APPOINTMENT (OUTPATIENT)
Dept: LAB | Facility: CLINIC | Age: 58
End: 2023-11-04
Payer: COMMERCIAL

## 2023-11-04 DIAGNOSIS — E03.9 ACQUIRED HYPOTHYROIDISM: ICD-10-CM

## 2023-11-04 DIAGNOSIS — E11.9 TYPE 2 DIABETES, HBA1C GOAL < 7% (HCC): ICD-10-CM

## 2023-11-04 LAB
ALBUMIN SERPL BCP-MCNC: 4.6 G/DL (ref 3.5–5)
ALP SERPL-CCNC: 97 U/L (ref 34–104)
ALT SERPL W P-5'-P-CCNC: 30 U/L (ref 7–52)
ANION GAP SERPL CALCULATED.3IONS-SCNC: 7 MMOL/L
AST SERPL W P-5'-P-CCNC: 14 U/L (ref 13–39)
BASOPHILS # BLD AUTO: 0.07 THOUSANDS/ÂΜL (ref 0–0.1)
BASOPHILS NFR BLD AUTO: 1 % (ref 0–1)
BILIRUB SERPL-MCNC: 0.9 MG/DL (ref 0.2–1)
BUN SERPL-MCNC: 13 MG/DL (ref 5–25)
CALCIUM SERPL-MCNC: 9.9 MG/DL (ref 8.4–10.2)
CHLORIDE SERPL-SCNC: 100 MMOL/L (ref 96–108)
CHOLEST SERPL-MCNC: 105 MG/DL
CO2 SERPL-SCNC: 33 MMOL/L (ref 21–32)
CREAT SERPL-MCNC: 0.96 MG/DL (ref 0.6–1.3)
CREAT UR-MCNC: 100.8 MG/DL
EOSINOPHIL # BLD AUTO: 0.13 THOUSAND/ÂΜL (ref 0–0.61)
EOSINOPHIL NFR BLD AUTO: 2 % (ref 0–6)
ERYTHROCYTE [DISTWIDTH] IN BLOOD BY AUTOMATED COUNT: 11.6 % (ref 11.6–15.1)
GFR SERPL CREATININE-BSD FRML MDRD: 85 ML/MIN/1.73SQ M
GLUCOSE P FAST SERPL-MCNC: 343 MG/DL (ref 65–99)
HCT VFR BLD AUTO: 48.3 % (ref 36.5–49.3)
HDLC SERPL-MCNC: 30 MG/DL
HGB BLD-MCNC: 17.9 G/DL (ref 12–17)
IMM GRANULOCYTES # BLD AUTO: 0.01 THOUSAND/UL (ref 0–0.2)
IMM GRANULOCYTES NFR BLD AUTO: 0 % (ref 0–2)
LDLC SERPL CALC-MCNC: 35 MG/DL (ref 0–100)
LYMPHOCYTES # BLD AUTO: 1.74 THOUSANDS/ÂΜL (ref 0.6–4.47)
LYMPHOCYTES NFR BLD AUTO: 24 % (ref 14–44)
MCH RBC QN AUTO: 33.7 PG (ref 26.8–34.3)
MCHC RBC AUTO-ENTMCNC: 37.1 G/DL (ref 31.4–37.4)
MCV RBC AUTO: 91 FL (ref 82–98)
MICROALBUMIN UR-MCNC: 80.8 MG/L
MICROALBUMIN/CREAT 24H UR: 80 MG/G CREATININE (ref 0–30)
MONOCYTES # BLD AUTO: 0.67 THOUSAND/ÂΜL (ref 0.17–1.22)
MONOCYTES NFR BLD AUTO: 9 % (ref 4–12)
NEUTROPHILS # BLD AUTO: 4.7 THOUSANDS/ÂΜL (ref 1.85–7.62)
NEUTS SEG NFR BLD AUTO: 64 % (ref 43–75)
NRBC BLD AUTO-RTO: 0 /100 WBCS
PLATELET # BLD AUTO: 200 THOUSANDS/UL (ref 149–390)
PMV BLD AUTO: 11.8 FL (ref 8.9–12.7)
POTASSIUM SERPL-SCNC: 3.8 MMOL/L (ref 3.5–5.3)
PROT SERPL-MCNC: 7 G/DL (ref 6.4–8.4)
RBC # BLD AUTO: 5.31 MILLION/UL (ref 3.88–5.62)
SODIUM SERPL-SCNC: 140 MMOL/L (ref 135–147)
T4 FREE SERPL-MCNC: 0.73 NG/DL (ref 0.61–1.12)
TRIGL SERPL-MCNC: 201 MG/DL
TSH SERPL DL<=0.05 MIU/L-ACNC: 4.41 UIU/ML (ref 0.45–4.5)
WBC # BLD AUTO: 7.32 THOUSAND/UL (ref 4.31–10.16)

## 2023-11-04 PROCEDURE — 80061 LIPID PANEL: CPT

## 2023-11-04 PROCEDURE — 83036 HEMOGLOBIN GLYCOSYLATED A1C: CPT

## 2023-11-04 PROCEDURE — 84443 ASSAY THYROID STIM HORMONE: CPT

## 2023-11-04 PROCEDURE — 36415 COLL VENOUS BLD VENIPUNCTURE: CPT

## 2023-11-04 PROCEDURE — 80053 COMPREHEN METABOLIC PANEL: CPT

## 2023-11-04 PROCEDURE — 85025 COMPLETE CBC W/AUTO DIFF WBC: CPT

## 2023-11-04 PROCEDURE — 84439 ASSAY OF FREE THYROXINE: CPT

## 2023-11-04 PROCEDURE — 82043 UR ALBUMIN QUANTITATIVE: CPT

## 2023-11-04 PROCEDURE — 82570 ASSAY OF URINE CREATININE: CPT

## 2023-11-05 LAB
EST. AVERAGE GLUCOSE BLD GHB EST-MCNC: 263 MG/DL
HBA1C MFR BLD: 10.8 %

## 2023-11-08 ENCOUNTER — TELEPHONE (OUTPATIENT)
Dept: INTERNAL MEDICINE CLINIC | Facility: CLINIC | Age: 58
End: 2023-11-08

## 2023-11-08 NOTE — TELEPHONE ENCOUNTER
Left message for patient to call back office and schedule appointment (per Dr Stephanie Hannon)       ===View-only below this line===  ----- Message -----  From: Suad Goodman MD  Sent: 11/8/2023   2:34 PM EST  To: Hope Knowles    he needs appointment with me, sugar very high

## 2023-11-15 ENCOUNTER — OFFICE VISIT (OUTPATIENT)
Dept: INTERNAL MEDICINE CLINIC | Facility: CLINIC | Age: 58
End: 2023-11-15
Payer: COMMERCIAL

## 2023-11-15 VITALS
DIASTOLIC BLOOD PRESSURE: 78 MMHG | SYSTOLIC BLOOD PRESSURE: 134 MMHG | HEART RATE: 82 BPM | OXYGEN SATURATION: 95 % | HEIGHT: 69 IN | WEIGHT: 176 LBS | BODY MASS INDEX: 26.07 KG/M2 | TEMPERATURE: 98.4 F

## 2023-11-15 DIAGNOSIS — Z00.00 ANNUAL PHYSICAL EXAM: ICD-10-CM

## 2023-11-15 DIAGNOSIS — B18.2 CHRONIC HEPATITIS C WITHOUT HEPATIC COMA (HCC): ICD-10-CM

## 2023-11-15 DIAGNOSIS — C14.0 THROAT CANCER (HCC): ICD-10-CM

## 2023-11-15 DIAGNOSIS — E78.2 MIXED HYPERLIPIDEMIA: ICD-10-CM

## 2023-11-15 DIAGNOSIS — E11.65 UNCONTROLLED TYPE 2 DIABETES MELLITUS WITH HYPERGLYCEMIA (HCC): Primary | ICD-10-CM

## 2023-11-15 DIAGNOSIS — E03.9 ACQUIRED HYPOTHYROIDISM: ICD-10-CM

## 2023-11-15 DIAGNOSIS — K21.9 GASTROESOPHAGEAL REFLUX DISEASE WITHOUT ESOPHAGITIS: ICD-10-CM

## 2023-11-15 PROCEDURE — 99396 PREV VISIT EST AGE 40-64: CPT | Performed by: INTERNAL MEDICINE

## 2023-11-15 PROCEDURE — 99214 OFFICE O/P EST MOD 30 MIN: CPT | Performed by: INTERNAL MEDICINE

## 2023-11-15 NOTE — PROGRESS NOTES
ADULT ANNUAL [address] INTERNAL MEDICINE    NAME: Shonda Jimenez  AGE: 59 y.o. SEX: male  : 1965     DATE: 11/15/2023     Assessment and Plan:     Problem List Items Addressed This Visit          Digestive    GERD (gastroesophageal reflux disease)    Chronic hepatitis C without hepatic coma (HCC)       Endocrine    Acquired hypothyroidism    Uncontrolled type 2 diabetes mellitus with hyperglycemia ([address]) - Primary    Relevant Medications    sitaGLIPtin-metFORMIN (JANUMET)  MG per tablet    Continuous Blood Gluc  (FreeStyle [place] 2 [place]) NISHA    Continuous Blood Gluc Sensor (FreeStyle Reyna 2 Sensor) MISC    Other Relevant Orders    Ambulatory referral to Diabetic Education - use to refer for diabetes group classes, individual diabetes education, medical nutrition therapy, device training       Other    Throat cancer ([address])    Mixed hyperlipidemia     Other Visit Diagnoses       Annual physical exam                Immunizations and preventive care screenings were discussed with patient today. Appropriate education was printed on patient's after visit summary. Discussed risks and benefits of prostate cancer screening. We discussed the controversial history of PSA screening for prostate cancer in the [facility] as well as the risk of over detection and over treatment of prostate cancer by way of PSA screening. The patient understands that PSA blood testing is an imperfect way to screen for prostate cancer and that elevated PSA levels in the blood may also be caused by infection, inflammation, prostatic trauma or manipulation, urological procedures, or by benign prostatic enlargement. The role of the digital rectal examination in prostate cancer screening was also discussed and I discussed with him that there is large interobserver variability in the findings of digital rectal examination.     Counseling:  Exercise: the importance of regular exercise/physical activity was discussed. Recommend exercise 3-5 times per week for at least 30 minutes. BMI Counseling: Body mass index is 25.99 kg/m². The BMI is above normal. Nutrition recommendations include decreasing portion sizes, encouraging healthy choices of fruits and vegetables and decreasing fast food intake. Exercise recommendations include moderate physical activity 150 minutes/week. Rationale for BMI follow-up plan is due to patient being overweight or obese. Depression Screening and Follow-up Plan: Patient was screened for depression during today's encounter. They screened negative with a PHQ-2 score of 0. Tobacco Cessation Counseling: Tobacco cessation counseling was provided. The patient is sincerely urged to quit consumption of tobacco. He is ready to quit tobacco.         No follow-ups on file. Chief Complaint:     Chief Complaint   Patient presents with    Follow-up     Go over blood work      Physical Exam      History of Present Illness:     Adult Annual Physical   Patient here for a comprehensive physical exam. The patient reports problems - urine frequency . Diet and Physical Activity  Diet/Nutrition: diabetic diet. Exercise: moderate cardiovascular exercise. Depression Screening  PHQ-2/9 Depression Screening    Little interest or pleasure in doing things: 0 - not at all  Feeling down, depressed, or hopeless: 0 - not at all  PHQ-2 Score: 0  PHQ-2 Interpretation: Negative depression screen       General Health  Sleep: sleeps well. Hearing: normal - bilateral.  Vision: no vision problems. Dental: no dental visits for >1 year.  Health  Symptoms include: urinary frequency    Advanced Care Planning  Do you have an advanced directive? no  Do you have a durable medical power of ? no     Review of Systems:     Review of Systems   Constitutional:  Negative for chills and fever.    HENT:  Negative for congestion, ear pain and sore throat. Eyes:  Negative for pain. Respiratory:  Negative for cough and shortness of breath. Cardiovascular:  Negative for chest pain and leg swelling. Gastrointestinal:  Negative for abdominal pain, nausea and vomiting. Endocrine: Negative for polyuria. Genitourinary:  Negative for difficulty urinating, frequency and urgency. Musculoskeletal:  Positive for arthralgias and back pain. Skin:  Negative for rash. Neurological:  Negative for weakness and headaches. Psychiatric/Behavioral:  Negative for sleep disturbance. The patient is not nervous/anxious.        Past Medical History:     Past Medical History:   Diagnosis Date    GERD (gastroesophageal reflux disease)     Hepatitis C     Hypertriglyceridemia     Impaired fasting glucose     Throat cancer (HCC)       Past Surgical History:     Past Surgical History:   Procedure Laterality Date    KNEE SURGERY      MULTIPLE TOOTH EXTRACTIONS      PEG TUBE PLACEMENT      PEG TUBE REMOVAL      REPLACEMENT TOTAL KNEE Right     TRACHEOSTOMY        Family History:     Family History   Problem Relation Age of Onset    Heart disease Father     Lung cancer Sister       Social History:     Social History     Socioeconomic History    Marital status: Registered Domestic Partner     Spouse name: None    Number of children: None    Years of education: None    Highest education level: None   Occupational History    None   Tobacco Use    Smoking status: Every Day     Packs/day: 0.25     Years: 48.00     Total pack years: 12.00     Types: Cigarettes     Start date: 1975    Smokeless tobacco: Never   Vaping Use    Vaping Use: Never used   Substance and Sexual Activity    Alcohol use: Not Currently     Alcohol/week: 1.0 standard drink of alcohol     Types: 1 Glasses of wine per week     Comment: rarely    Drug use: Never    Sexual activity: None   Other Topics Concern    None   Social History Narrative    None     Social Determinants of Health     Financial Resource Strain: Not on file   Food Insecurity: Not on file   Transportation Needs: Not on file   Physical Activity: Not on file   Stress: Not on file   Social Connections: Not on file   Intimate Partner Violence: Not on file   Housing Stability: Not on file      Current Medications:     Current Outpatient Medications   Medication Sig Dispense Refill    Continuous Blood Gluc  (FreeStyle [place] 2 [place]) NISHA Apply 1 Application topically every 14 (fourteen) days 1 each 0    Continuous Blood Gluc Sensor (FreeStyle Reyna 2 Sensor) MISC Use 1 Application every 14 (fourteen) days 6 each 2    ketoconazole (NIZORAL) 2 % cream Apply topically daily 15 g 0    omeprazole (PriLOSEC) 40 MG capsule Take 1 capsule (40 mg total) by mouth daily 90 capsule 1    sitaGLIPtin-metFORMIN (JANUMET)  MG per tablet Take 1 tablet by mouth 2 (two) times a day with meals 60 tablet 1    Synthroid 88 MCG tablet Take 1 tablet (88 mcg total) by mouth daily 90 tablet 1    atorvastatin (LIPITOR) 40 mg tablet Take 1 tablet (40 mg total) by mouth daily (Patient not taking: Reported on 11/15/2023) 90 tablet 1     No current facility-administered medications for this visit. Allergies: Allergies   Allergen Reactions    Penicillins       Physical Exam:     /78 (BP Location: Left arm, Patient Position: Sitting, Cuff Size: Standard)   Pulse 82   Temp 98.4 °F (36.9 °C) (Temporal)   Ht 5' 9" (1.753 m)   Wt 79.8 kg (176 lb)   SpO2 95%   BMI 25.99 kg/m²     Physical Exam  Vitals and nursing note reviewed. Constitutional:       General: He is not in acute distress. Appearance: He is well-developed. HENT:      Head: Normocephalic and atraumatic. Eyes:      Conjunctiva/sclera: Conjunctivae normal.   Cardiovascular:      Rate and Rhythm: Normal rate and regular rhythm. Heart sounds: No murmur heard. Pulmonary:      Effort: Pulmonary effort is normal. No respiratory distress. Breath sounds: Normal breath sounds. Abdominal:      Palpations: Abdomen is soft. Tenderness: There is no abdominal tenderness. Musculoskeletal:         General: No swelling. Cervical back: Neck supple. Skin:     General: Skin is warm and dry. Capillary Refill: Capillary refill takes less than 2 seconds. Neurological:      Mental Status: He is alert.    Psychiatric:         Mood and Affect: Mood normal.          Chino Gutierrez MD  [facility] INTERNAL MEDICINE

## 2023-11-15 NOTE — PROGRESS NOTES
Assessment/Plan:      Depression Screening and Follow-up Plan: Patient was screened for depression during today's encounter. They screened negative with a PHQ-2 score of 0. Tobacco Cessation Counseling: Tobacco cessation counseling was provided. The patient is sincerely urged to quit consumption of tobacco. He is ready to quit tobacco.             1. Uncontrolled type 2 diabetes mellitus with hyperglycemia (HCC)  -     sitaGLIPtin-metFORMIN (JANUMET)  MG per tablet; Take 1 tablet by mouth 2 (two) times a day with meals  -     Continuous Blood Gluc  (FreeStyle [place] 2 [place]) NISHA; Apply 1 Application topically every 14 (fourteen) days  -     Continuous Blood Gluc Sensor (FreeStyle Reyna 2 Sensor) MISC; Use 1 Application every 14 (fourteen) days  -     Ambulatory referral to Diabetic Education - use to refer for diabetes group classes, individual diabetes education, medical nutrition therapy, device training; Future; Expected date: 11/15/2023    2. Mixed hyperlipidemia    3. Throat cancer ([address])    4. Acquired hypothyroidism    5. Gastroesophageal reflux disease without esophagitis    6. Chronic hepatitis C without hepatic coma (HCC)    7. Annual physical exam           Subjective:      Patient ID: Ash Alcaraz is a 59 y.o. male. Follow-up on blood test done on 11/4/2023 test discussed with him, also physical        The following portions of the patient's history were reviewed and updated as appropriate: He  has a past medical history of GERD (gastroesophageal reflux disease), Hepatitis C, Hypertriglyceridemia, Impaired fasting glucose, and Throat cancer ([address]).   He   Patient Active Problem List    Diagnosis Date Noted    Uncontrolled type 2 diabetes mellitus with hyperglycemia ([address]) 11/15/2023    Type 2 diabetes, HbA1c goal < 7% ([address]) 12/08/2022    COVID-19 virus detected 11/10/2022    Plantar fasciitis, right 10/17/2022    Tobacco use 03/17/2021    Acquired hypothyroidism 01/26/2021 Mixed hyperlipidemia 01/26/2021    Throat cancer (HCC)     Impaired fasting glucose     Hypertriglyceridemia     GERD (gastroesophageal reflux disease)     Chronic hepatitis C without hepatic coma ([address])      He  has a past surgical history that includes Knee surgery; Replacement total knee (Right); PEG tube placement; PEG tube removal; TRACHEOSTOMY; and Multiple tooth extractions. His family history includes Heart disease in his father; Lung cancer in his sister. He  reports that he has been smoking cigarettes. He started smoking about 48 years ago. He has a 12.00 pack-year smoking history. He has never used smokeless tobacco. He reports that he does not currently use alcohol after a past usage of about 1.0 standard drink of alcohol per week. He reports that he does not use drugs. Current Outpatient Medications   Medication Sig Dispense Refill    Continuous Blood Gluc  (FreeStyle [place] 2 [place]) NISHA Apply 1 Application topically every 14 (fourteen) days 1 each 0    Continuous Blood Gluc Sensor (FreeStyle Reyna 2 Sensor) MISC Use 1 Application every 14 (fourteen) days 6 each 2    ketoconazole (NIZORAL) 2 % cream Apply topically daily 15 g 0    omeprazole (PriLOSEC) 40 MG capsule Take 1 capsule (40 mg total) by mouth daily 90 capsule 1    sitaGLIPtin-metFORMIN (JANUMET)  MG per tablet Take 1 tablet by mouth 2 (two) times a day with meals 60 tablet 1    Synthroid 88 MCG tablet Take 1 tablet (88 mcg total) by mouth daily 90 tablet 1    atorvastatin (LIPITOR) 40 mg tablet Take 1 tablet (40 mg total) by mouth daily (Patient not taking: Reported on 11/15/2023) 90 tablet 1     No current facility-administered medications for this visit.      Current Outpatient Medications on File Prior to Visit   Medication Sig    ketoconazole (NIZORAL) 2 % cream Apply topically daily    omeprazole (PriLOSEC) 40 MG capsule Take 1 capsule (40 mg total) by mouth daily    Synthroid 88 MCG tablet Take 1 tablet (88 mcg total) by mouth daily    atorvastatin (LIPITOR) 40 mg tablet Take 1 tablet (40 mg total) by mouth daily (Patient not taking: Reported on 11/15/2023)     No current facility-administered medications on file prior to visit. He is allergic to penicillins. .    Review of Systems   Constitutional:  Negative for chills and fever. HENT:  Negative for congestion, ear pain and sore throat. Eyes:  Negative for pain. Respiratory:  Negative for cough and shortness of breath. Cardiovascular:  Negative for chest pain and leg swelling. Gastrointestinal:  Negative for abdominal pain, nausea and vomiting. Endocrine: Negative for polyuria. Genitourinary:  Negative for difficulty urinating, frequency and urgency. Musculoskeletal:  Positive for arthralgias and back pain. Skin:  Negative for rash. Neurological:  Negative for weakness and headaches. Psychiatric/Behavioral:  Negative for sleep disturbance. The patient is not nervous/anxious.           Objective:      /78 (BP Location: Left arm, Patient Position: Sitting, Cuff Size: Standard)   Pulse 82   Temp 98.4 °F (36.9 °C) (Temporal)   Ht 5' 9" (1.753 m)   Wt 79.8 kg (176 lb)   SpO2 95%   BMI 25.99 kg/m²     Recent Results (from the past 1344 hour(s))   Microalbumin / creatinine urine ratio    Collection Time: 11/04/23  8:26 AM   Result Value Ref Range    Creatinine, Ur 100.8 Reference range not established. mg/dL    Albumin,U,Random 80.8 (H) <20.0 mg/L    Albumin Creat Ratio 80 (H) 0 - 30 mg/g creatinine   TSH, 3rd generation    Collection Time: 11/04/23  8:26 AM   Result Value Ref Range    TSH 3RD GENERATON 4.410 0.450 - 4.500 uIU/mL   T4, free    Collection Time: 11/04/23  8:26 AM   Result Value Ref Range    Free T4 0.73 0.61 - 1.12 ng/dL   CBC and differential    Collection Time: 11/04/23  8:26 AM   Result Value Ref Range    WBC 7.32 4.31 - 10.16 Thousand/uL    RBC 5.31 3.88 - 5.62 Million/uL    Hemoglobin 17.9 (H) 12.0 - 17.0 g/dL    Hematocrit 48.3 36.5 - 49.3 %    MCV 91 82 - 98 fL    MCH 33.7 26.8 - 34.3 pg    MCHC 37.1 31.4 - 37.4 g/dL    RDW 11.6 11.6 - 15.1 %    MPV 11.8 8.9 - 12.7 fL    Platelets 096 856 - 886 Thousands/uL    nRBC 0 /100 WBCs    Neutrophils Relative 64 43 - 75 %    Immat GRANS % 0 0 - 2 %    Lymphocytes Relative 24 14 - 44 %    Monocytes Relative 9 4 - 12 %    Eosinophils Relative 2 0 - 6 %    Basophils Relative 1 0 - 1 %    Neutrophils Absolute 4.70 1.85 - 7.62 Thousands/µL    Immature Grans Absolute 0.01 0.00 - 0.20 Thousand/uL    Lymphocytes Absolute 1.74 0.60 - 4.47 Thousands/µL    Monocytes Absolute 0.67 0.17 - 1.22 Thousand/µL    Eosinophils Absolute 0.13 0.00 - 0.61 Thousand/µL    Basophils Absolute 0.07 0.00 - 0.10 Thousands/µL   Comprehensive metabolic panel    Collection Time: 11/04/23  8:26 AM   Result Value Ref Range    Sodium 140 135 - 147 mmol/L    Potassium 3.8 3.5 - 5.3 mmol/L    Chloride 100 96 - 108 mmol/L    CO2 33 (H) 21 - 32 mmol/L    ANION GAP 7 mmol/L    BUN 13 5 - 25 mg/dL    Creatinine 0.96 0.60 - 1.30 mg/dL    Glucose, Fasting 343 (H) 65 - 99 mg/dL    Calcium 9.9 8.4 - 10.2 mg/dL    AST 14 13 - 39 U/L    ALT 30 7 - 52 U/L    Alkaline Phosphatase 97 34 - 104 U/L    Total Protein 7.0 6.4 - 8.4 g/dL    Albumin 4.6 3.5 - 5.0 g/dL    Total Bilirubin 0.90 0.20 - 1.00 mg/dL    eGFR 85 ml/min/1.73sq m   Hemoglobin A1C    Collection Time: 11/04/23  8:26 AM   Result Value Ref Range    Hemoglobin A1C 10.8 (H) Normal 4.0-5.6%; PreDiabetic 5.7-6.4%; Diabetic >=6.5%; Glycemic control for adults with diabetes <7.0% %     mg/dl   Lipid Panel with Direct LDL reflex    Collection Time: 11/04/23  8:26 AM   Result Value Ref Range    Cholesterol 105 See Comment mg/dL    Triglycerides 201 (H) See Comment mg/dL    HDL, Direct 30 (L) >=40 mg/dL    LDL Calculated 35 0 - 100 mg/dL        Physical Exam  Constitutional:       Appearance: Normal appearance. HENT:      Head: Normocephalic.       Right Ear: Tympanic membrane, ear canal and external ear normal.      Left Ear: Tympanic membrane, ear canal and external ear normal.      Nose: Nose normal. No congestion. Mouth/Throat:      Mouth: Mucous membranes are moist.      Pharynx: Oropharynx is clear. No oropharyngeal exudate or posterior oropharyngeal erythema. Eyes:      Extraocular Movements: Extraocular movements intact. Conjunctiva/sclera: Conjunctivae normal.   Cardiovascular:      Rate and Rhythm: Normal rate and regular rhythm. Heart sounds: Normal heart sounds. No murmur heard. Pulmonary:      Effort: Pulmonary effort is normal.      Breath sounds: Normal breath sounds. No wheezing or rales. Abdominal:      General: Abdomen is flat. There is no distension. Palpations: Abdomen is soft. Tenderness: There is no abdominal tenderness. Musculoskeletal:         General: Normal range of motion. Cervical back: Normal range of motion and neck supple. Right lower leg: No edema. Left lower leg: No edema. Lymphadenopathy:      Cervical: No cervical adenopathy. Skin:     General: Skin is warm. Neurological:      General: No focal deficit present. Mental Status: He is alert and oriented to person, place, and time.

## 2023-11-15 NOTE — PATIENT INSTRUCTIONS
Wellness Visit for Adults   AMBULATORY CARE:   A wellness visit  is when you see your healthcare provider to get screened for health problems. Your healthcare provider will also give you advice on how to stay healthy. Write down your questions so you remember to ask them. Ask your healthcare provider how often you should have a wellness visit. What happens at a wellness visit:  Your healthcare provider will ask about your health, and your family history of health problems. This includes high blood pressure, heart disease, and cancer. He or she will ask if you have symptoms that concern you, if you smoke, and about your mood. You may also be asked about your intake of medicines, supplements, food, and alcohol. Any of the following may be done: Your weight  will be checked. Your height may also be checked so your body mass index (BMI) can be calculated. Your BMI shows if you are at a healthy weight. Your blood pressure  and heart rate will be checked. Your temperature may also be checked. Blood and urine tests  may be done. Blood tests may be done to check your cholesterol levels. Abnormal cholesterol levels increase your risk for heart disease and stroke. You may also need a blood or urine test to check for diabetes if you are at increased risk. Urine tests may be done to look for signs of an infection or kidney disease. A physical exam  includes checking your heartbeat and lungs with a stethoscope. Your healthcare provider may also check your skin to look for sun damage. Screening tests  may be recommended. A screening test is done to check for diseases that may not cause symptoms. The screening tests you may need depend on your age, gender, family history, and lifestyle habits. For example, colorectal screening may be recommended if you are 48years old or older. Screening tests you need if you are a woman:   A Pap smear  is used to screen for cervical cancer.  Pap smears are usually done every 3 to 5 years depending on your age. You may need them more often if you have had abnormal Pap smear test results in the past. Ask your healthcare provider how often you should have a Pap smear. A mammogram  is an x-ray of your breasts to screen for breast cancer. Experts recommend mammograms every 2 years starting at age 48 years. You may need a mammogram at age 52 years or younger if you have an increased risk for breast cancer. Talk to your healthcare provider about when you should start having mammograms and how often you need them. Vaccines you may need:   Get an influenza vaccine  every year. The influenza vaccine protects you from the flu. Several types of viruses cause the flu. The viruses change over time, so new vaccines are made each year. Get a tetanus-diphtheria (Td) booster vaccine  every 10 years. This vaccine protects you against tetanus and diphtheria. Tetanus is a severe infection that may cause painful muscle spasms and lockjaw. Diphtheria is a severe bacterial infection that causes a thick covering in the back of your mouth and throat. Get a human papillomavirus (HPV) vaccine  if you are female and aged 23 to 32 or male 23 to 24 and never received it. This vaccine protects you from HPV infection. HPV is the most common infection spread by sexual contact. HPV may also cause vaginal, penile, and anal cancers. Get a pneumococcal vaccine  if you are aged 72 years or older. The pneumococcal vaccine is an injection given to protect you from pneumococcal disease. Pneumococcal disease is an infection caused by pneumococcal bacteria. The infection may cause pneumonia, meningitis, or an ear infection. Get a shingles vaccine  if you are 60 or older, even if you have had shingles before. The shingles vaccine is an injection to protect you from the varicella-zoster virus. This is the same virus that causes chickenpox.  Shingles is a painful rash that develops in people who had chickenpox or have been exposed to the virus. How to eat healthy:  My Plate is a model for planning healthy meals. It shows the types and amounts of foods that should go on your plate. Fruits and vegetables make up about half of your plate, and grains and protein make up the other half. A serving of dairy is included on the side of your plate. The amount of calories and serving sizes you need depends on your age, gender, weight, and height. Examples of healthy foods are listed below:  Eat a variety of vegetables  such as dark green, red, and orange vegetables. You can also include canned vegetables low in sodium (salt) and frozen vegetables without added butter or sauces. Eat a variety of fresh fruits , canned fruit in 100% juice, frozen fruit, and dried fruit. Include whole grains. At least half of the grains you eat should be whole grains. Examples include whole-wheat bread, wheat pasta, brown rice, and whole-grain cereals such as oatmeal.    Eat a variety of protein foods such as seafood (fish and shellfish), lean meat, and poultry without skin (turkey and chicken). Examples of lean meats include pork leg, shoulder, or tenderloin, and beef round, sirloin, tenderloin, and extra lean ground beef. Other protein foods include eggs and egg substitutes, beans, peas, soy products, nuts, and seeds. Choose low-fat dairy products such as skim or 1% milk or low-fat yogurt, cheese, and cottage cheese. Limit unhealthy fats  such as butter, hard margarine, and shortening. Exercise:  Exercise at least 30 minutes per day on most days of the week. Some examples of exercise include walking, biking, dancing, and swimming. You can also fit in more physical activity by taking the stairs instead of the elevator or parking farther away from stores. Include muscle strengthening activities 2 days each week. Regular exercise provides many health benefits.  It helps you manage your weight, and decreases your risk for type 2 diabetes, heart disease, stroke, and high blood pressure. Exercise can also help improve your mood. Ask your healthcare provider about the best exercise plan for you. General health and safety guidelines:   Do not smoke. Nicotine and other chemicals in cigarettes and cigars can cause lung damage. Ask your healthcare provider for information if you currently smoke and need help to quit. E-cigarettes or smokeless tobacco still contain nicotine. Talk to your healthcare provider before you use these products. Limit alcohol. A drink of alcohol is 12 ounces of beer, 5 ounces of wine, or 1½ ounces of liquor. Lose weight, if needed. Being overweight increases your risk of certain health conditions. These include heart disease, high blood pressure, type 2 diabetes, and certain types of cancer. Protect your skin. Do not sunbathe or use tanning beds. Use sunscreen with a SPF 15 or higher. Apply sunscreen at least 15 minutes before you go outside. Reapply sunscreen every 2 hours. Wear protective clothing, hats, and sunglasses when you are outside. Drive safely. Always wear your seatbelt. Make sure everyone in your car wears a seatbelt. A seatbelt can save your life if you are in an accident. Do not use your cell phone when you are driving. This could distract you and cause an accident. Pull over if you need to make a call or send a text message. Practice safe sex. Use latex condoms if are sexually active and have more than one partner. Your healthcare provider may recommend screening tests for sexually transmitted infections (STIs). Wear helmets, lifejackets, and protective gear. Always wear a helmet when you ride a bike or motorcycle, go skiing, or play sports that could cause a head injury. Wear protective equipment when you play sports. Wear a lifejacket when you are on a boat or doing water sports.     © Copyright [employer] 2023 Information is for End User's use only and may not be sold, redistributed or otherwise used for commercial purposes. The above information is an  only. It is not intended as medical advice for individual conditions or treatments. Talk to your doctor, nurse or pharmacist before following any medical regimen to see if it is safe and effective for you.

## 2023-11-16 ENCOUNTER — TELEPHONE (OUTPATIENT)
Dept: INTERNAL MEDICINE CLINIC | Facility: CLINIC | Age: 58
End: 2023-11-16

## 2023-11-16 DIAGNOSIS — E78.2 MIXED HYPERLIPIDEMIA: ICD-10-CM

## 2023-11-16 RX ORDER — PRAVASTATIN SODIUM 40 MG
40 TABLET ORAL
Qty: 30 TABLET | Refills: 1 | Status: SHIPPED | OUTPATIENT
Start: 2023-11-16

## 2023-12-05 ENCOUNTER — OFFICE VISIT (OUTPATIENT)
Dept: ENDOCRINOLOGY | Facility: CLINIC | Age: 58
End: 2023-12-05
Payer: COMMERCIAL

## 2023-12-05 VITALS
WEIGHT: 166.4 LBS | HEART RATE: 89 BPM | SYSTOLIC BLOOD PRESSURE: 138 MMHG | HEIGHT: 69 IN | DIASTOLIC BLOOD PRESSURE: 94 MMHG | BODY MASS INDEX: 24.65 KG/M2

## 2023-12-05 DIAGNOSIS — E11.65 UNCONTROLLED TYPE 2 DIABETES MELLITUS WITH HYPERGLYCEMIA (HCC): Primary | ICD-10-CM

## 2023-12-05 DIAGNOSIS — E78.2 MIXED HYPERLIPIDEMIA: ICD-10-CM

## 2023-12-05 DIAGNOSIS — E11.65 TYPE 2 DIABETES MELLITUS WITH HYPERGLYCEMIA, WITHOUT LONG-TERM CURRENT USE OF INSULIN (HCC): ICD-10-CM

## 2023-12-05 DIAGNOSIS — E03.9 ACQUIRED HYPOTHYROIDISM: ICD-10-CM

## 2023-12-05 PROCEDURE — 99214 OFFICE O/P EST MOD 30 MIN: CPT

## 2023-12-05 RX ORDER — BLOOD SUGAR DIAGNOSTIC
STRIP MISCELLANEOUS
Qty: 100 STRIP | Refills: 2 | Status: SHIPPED | OUTPATIENT
Start: 2023-12-05 | End: 2023-12-05

## 2023-12-05 RX ORDER — BLOOD SUGAR DIAGNOSTIC
STRIP MISCELLANEOUS
Qty: 100 STRIP | Refills: 2 | Status: SHIPPED | OUTPATIENT
Start: 2023-12-05

## 2023-12-05 RX ORDER — METFORMIN HYDROCHLORIDE 500 MG/1
TABLET, EXTENDED RELEASE ORAL
Qty: 90 TABLET | Refills: 1 | Status: SHIPPED | OUTPATIENT
Start: 2023-12-05

## 2023-12-05 RX ORDER — LANCETS
EACH MISCELLANEOUS
Qty: 100 EACH | Refills: 1 | Status: SHIPPED | OUTPATIENT
Start: 2023-12-05

## 2023-12-05 RX ORDER — BLOOD-GLUCOSE METER
EACH MISCELLANEOUS
Qty: 1 KIT | Refills: 0 | Status: SHIPPED | OUTPATIENT
Start: 2023-12-05

## 2023-12-05 NOTE — ASSESSMENT & PLAN NOTE
HGA1C very elevated. No BGs to review - he has not checked BGs. Treatment regimen:   - Will start on Metformin. Will start Metformin 500 mg daily and titrate up to 1,000 mg twice daily if tolerated. Provided him with written instructions and reviewed side effects.   - He needs to schedule appointment with diabetes education for MNT, glucometer training and training on Reyna CGM. I have ordered referral.  - I will also order antibody testing to r/o type 1 diabetes due to his drastic weight loss and recent elevation in A1c. Discussed risks/complications associated with uncontrolled diabetes. Advised to adhere to diabetic diet, and recommended staying active/exercising routinely. Keep carbohydrates consistent to limit blood glucose fluctuations. Advised to call if blood sugars less than 70 mg/dl or over 300 mg/dl. Discussed symptoms and treatment of hypoglycemia. Recommended routine follow-up with podiatry and ophthalmology. Ordered blood work to complete prior to next visit.    Lab Results   Component Value Date    HGBA1C 10.8 (H) 11/04/2023

## 2023-12-05 NOTE — PROGRESS NOTES
Established Patient Progress Note      Chief Complaint   Patient presents with    Hypothyroidism    Diabetes Type 2        Impression & Plan:    Problem List Items Addressed This Visit          Endocrine    Acquired hypothyroidism     Thyroid function within target range, continue current dose of synthroid. Uncontrolled type 2 diabetes mellitus with hyperglycemia (HCC) - Primary     HGA1C very elevated. No BGs to review - he has not checked BGs. Treatment regimen:   - Will start on Metformin. Will start Metformin 500 mg daily and titrate up to 1,000 mg twice daily if tolerated. Provided him with written instructions and reviewed side effects.   - He needs to schedule appointment with diabetes education for MNT, glucometer training and training on Reyna CGM. I have ordered referral.  - I will also order antibody testing to r/o type 1 diabetes due to his drastic weight loss and recent elevation in A1c. Discussed risks/complications associated with uncontrolled diabetes. Advised to adhere to diabetic diet, and recommended staying active/exercising routinely. Keep carbohydrates consistent to limit blood glucose fluctuations. Advised to call if blood sugars less than 70 mg/dl or over 300 mg/dl. Discussed symptoms and treatment of hypoglycemia. Recommended routine follow-up with podiatry and ophthalmology. Ordered blood work to complete prior to next visit.    Lab Results   Component Value Date    HGBA1C 10.8 (H) 11/04/2023          Relevant Medications    metFORMIN (GLUCOPHAGE-XR) 500 mg 24 hr tablet    Blood Glucose Monitoring Suppl (Accu-Chek Bettye Plus) w/Device KIT    glucose blood (Accu-Chek Bettye Plus) test strip    Accu-Chek FastClix Lancets MISC    Other Relevant Orders    Ambulatory referral to Diabetic Education    Zinc Transporter 8 (Znt8) Antibody    Anti-islet cell antibody    GAD65, IA-2, and Insulin Autoantibody    C-peptide       Other    Mixed hyperlipidemia     LDL at goal, triglycerides elevated. Continue on statin therapy and recommend he focus on lifestyle modifications. History of Present Illness:   Patti Ramirez is a 59 y.o. male with hypothyroidism diagnosed January 2021. He is currently taking synthroid 88 mcg daily. He is taking this regularly and properly. He denies any symptoms of hypo/hyperthyroidism. He was initially on levothyroxine, but reports generic was not working for him. He denies any issues since starting on brand Synthroid. History of external radiation to neck b/c of throat cancer in 2010. No hx thyroid nodules. Denies FH of thyroid cancers. He has a new diagnosis of diabetes. A1c was elevated a year ago at 7.7 and then improved to 6.8 in April. Recent A1c was 10.8. He admits to frequent juice and soda intake. He has switched to sugar free soda about a week ago. His PCP ordered Janumet but was not covered so he has not taken any medication for his diabetes. He does not have glucometer and has not checked any BGs. He was prescribed Reyna CGM but has not had any training so has not started to use it. He has lost 20-25 pounds over the last few months. He reports lightlessness, fatigue, SOB, polyuria and polydipsia. He overall does not feel well. Denies neuropathy or chest pain.        Patient Active Problem List   Diagnosis    Throat cancer ([address] [facility])    Impaired fasting glucose    Hypertriglyceridemia    GERD (gastroesophageal reflux disease)    Chronic hepatitis C without hepatic coma (HCC)    Acquired hypothyroidism    Mixed hyperlipidemia    Tobacco use    Plantar fasciitis, right    COVID-19 virus detected    Type 2 diabetes, HbA1c goal < 7% ([address])    Uncontrolled type 2 diabetes mellitus with hyperglycemia (HCC)      Past Medical History:   Diagnosis Date    GERD (gastroesophageal reflux disease)     Hepatitis C     Hypertriglyceridemia     Impaired fasting glucose     Throat cancer [facility])       Past Surgical History:   Procedure Laterality Date    KNEE SURGERY      MULTIPLE TOOTH EXTRACTIONS      PEG TUBE PLACEMENT      PEG TUBE REMOVAL      REPLACEMENT TOTAL KNEE Right     TRACHEOSTOMY        Family History   Problem Relation Age of Onset    Heart disease Father     Hypertension Father     Thyroid disease unspecified Mother         Meds caused constip.so she stopped taking meds    Lung cancer Sister     Diabetes type I Paternal Grandmother      Social History     Tobacco Use    Smoking status: Former     Packs/day: 1.00     Years: 20.00     Total pack years: 20.00     Types: Cigarettes     Start date: 1975     Quit date: 2011     Years since quittin.8    Smokeless tobacco: Never    Tobacco comments:     Quit 12 years ago   Substance Use Topics    Alcohol use: Yes     Alcohol/week: 1.0 standard drink of alcohol     Types: 1 Glasses of wine per week     Comment: Rare. Not every week     Allergies   Allergen Reactions    Penicillins          Current Outpatient Medications:     Accu-Chek FastClix Lancets MISC, Check BGs 3-4 times daily. , Disp: 100 each, Rfl: 1    Blood Glucose Monitoring Suppl (Accu-Chek Bettye Plus) w/Device KIT, Check BG 3-4 times daily. , Disp: 1 kit, Rfl: 0    glucose blood (Accu-Chek Bettye Plus) test strip, Check BGs 3-4 times daily. , Disp: 100 strip, Rfl: 2    ketoconazole (NIZORAL) 2 % cream, Apply topically daily, Disp: 15 g, Rfl: 0    metFORMIN (GLUCOPHAGE-XR) 500 mg 24 hr tablet, Metformin Instructions: 1 tab daily with supper x 1 week 1 tab twice per day with food x 1 week 1 tab breakfast, 2 tabs dinner x 1 week 2 tabs with breakfast and supper., Disp: 90 tablet, Rfl: 1    omeprazole (PriLOSEC) 40 MG capsule, Take 1 capsule (40 mg total) by mouth daily, Disp: 90 capsule, Rfl: 1    pravastatin (PRAVACHOL) 40 mg tablet, Take 1 tablet (40 mg total) by mouth daily at bedtime, Disp: 30 tablet, Rfl: 1    Synthroid 88 MCG tablet, Take 1 tablet (88 mcg total) by mouth daily, Disp: 90 tablet, Rfl: 1    Continuous Blood Gluc  (FreeStyle [place] 2 [place]) NISHA, Apply 1 Application topically every 14 (fourteen) days (Patient not taking: Reported on 12/5/2023), Disp: 1 each, Rfl: 0    Continuous Blood Gluc Sensor (FreeStyle Reyna 2 Sensor) MISC, Use 1 Application every 14 (fourteen) days (Patient not taking: Reported on 12/5/2023), Disp: 6 each, Rfl: 2    Review of Systems   Constitutional:  Positive for unexpected weight change (lost 20-25 pounds). Negative for activity change, appetite change, chills, diaphoresis, fatigue and fever. HENT:  Negative for sore throat, trouble swallowing and voice change. Respiratory:  Positive for shortness of breath. Negative for chest tightness. Cardiovascular:  Negative for chest pain, palpitations and leg swelling. Gastrointestinal:  Positive for nausea. Negative for abdominal pain. Endocrine: Positive for polydipsia and polyuria. Negative for polyphagia. Neurological:  Positive for light-headedness. All other systems reviewed and are negative. Physical Exam:  Body mass index is 24.57 kg/m². /94   Pulse 89   Ht 5' 9" (1.753 m)   Wt 75.5 kg (166 lb 6.4 oz)   BMI 24.57 kg/m²    Wt Readings from Last 3 Encounters:   12/05/23 75.5 kg (166 lb 6.4 oz)   11/15/23 79.8 kg (176 lb)   05/15/23 78.9 kg (174 lb)       Physical Exam  Vitals reviewed. Constitutional:       Appearance: Normal appearance. Cardiovascular:      Rate and Rhythm: Normal rate and regular rhythm. Pulses: Normal pulses. Heart sounds: Normal heart sounds. Pulmonary:      Effort: Pulmonary effort is normal.      Breath sounds: Normal breath sounds. Neurological:      Mental Status: He is alert and oriented to person, place, and time. Psychiatric:         Mood and Affect: Mood normal.         Thought Content:  Thought content normal.         Labs:   Lab Results   Component Value Date    HGBA1C 10.8 (H) 11/04/2023    HGBA1C 6.8 (H) 04/07/2023    HGBA1C 7.7 (H) 11/23/2022     Lab Results Component Value Date    CREATININE 0.96 11/04/2023    CREATININE 0.81 11/23/2022    CREATININE 0.88 01/14/2022    BUN 13 11/04/2023     08/01/2015    K 3.8 11/04/2023     11/04/2023    CO2 33 (H) 11/04/2023     eGFR   Date Value Ref Range Status   11/04/2023 85 ml/min/1.73sq m Final     Lab Results   Component Value Date    CHOL 175 05/17/2014    HDL 30 (L) 11/04/2023    TRIG 201 (H) 11/04/2023     Lab Results   Component Value Date    ALT 30 11/04/2023    AST 14 11/04/2023    ALKPHOS 97 11/04/2023    BILITOT 0.69 08/01/2015     Lab Results   Component Value Date    NCJ2HORPJXAT 4.410 11/04/2023    DIW9EGVJKXWV 4.760 (H) 11/23/2022    TKK7GAZWGQPU 4.220 10/13/2022     Lab Results   Component Value Date    FREET4 0.73 11/04/2023       Orders Placed This Encounter   Procedures    Zinc Transporter 8 (Znt8) Antibody     Standing Status:   Future     Standing Expiration Date:   12/5/2024    Anti-islet cell antibody     Standing Status:   Future     Standing Expiration Date:   12/5/2024    GAD65, IA-2, and Insulin Autoantibody     Standing Status:   Future     Standing Expiration Date:   12/5/2024    C-peptide     This is a patient instruction: This test requires patient fasting for 14-16 hours. Standing Status:   Future     Standing Expiration Date:   12/5/2024    Ambulatory referral to Diabetic Education     Standing Status:   Future     Standing Expiration Date:   12/5/2024     Referral Priority:   Routine     Referral Type:   Consult - AMB     Referral Reason:   Specialty Services Required     Requested Specialty:   Diabetes Services     Number of Visits Requested:   1     Expiration Date:   12/5/2024       Patient Instructions   Start Metformin ER 500mg daily with dinner for 1 week. After 1 week if tolerating with no side effects, increase to 1 tab with breakfast and 1 tab with dinner.    After 1 week if tolerating with no side effects, increase to 1 tab with breakfast, 2 tabs with dinner  After 1 week if tolerating with no side effects, increase to 2 tabs with breakfast, and 2 tabs with dinner. Notify office if having side effects         Type 2 Diabetes in Adults: New Diagnosis   WHAT YOU NEED TO KNOW:   Type 2 diabetes is a disease that affects how your body uses glucose (sugar). Normally, when the blood sugar level increases, the pancreas makes more insulin. Insulin helps move sugar out of the blood so it can be used for energy. Type 2 diabetes develops because either the body cannot make enough insulin, or it cannot use the insulin correctly. Type 2 diabetes can be controlled to prevent damage to your heart, blood vessels, and other organs. DISCHARGE INSTRUCTIONS:   Have someone call your local emergency number (911 in the [address]) if:   You have any of the following signs of a stroke:      Numbness or drooping on one side of your face     Weakness in an arm or leg    Confusion or difficulty speaking    Dizziness, a severe headache, or vision loss    You have any of the following signs of a heart attack:      Squeezing, pressure, or pain in your chest    You may  also have any of the following:     Discomfort or pain in your back, neck, jaw, stomach, or arm    Shortness of breath    Nausea or vomiting    Lightheadedness or a sudden cold sweat    Return to the emergency department if:  You have trouble breathing. You have severe abdominal pain. You vomit for more than 2 hours. You have trouble staying awake or focusing. You are shaking or sweating. You feel weak or more tired than usual.     You have blurred or double vision. Your breath has a fruity, sweet smell. Call your doctor or care team if:   Your arms and legs are swollen. You have an upset stomach and cannot eat the foods on your meal plan. You feel dizzy, have headaches, or are easily irritated. Your skin is red, warm, dry, or swollen.      You have a wound that does not heal.     You have numbness in your arms or legs. You have trouble coping with your illness, or you feel anxious or depressed. You have questions or concerns about your condition or care. Medicines: You may  need any of the following:  Diabetes medicines or insulin  may be given to decrease the amount of sugar in your blood. Blood pressure medicine  may be given to lower your blood pressure. Cholesterol-lowering medicine  may be given to prevent heart disease. Antiplatelets , such as aspirin, help prevent blood clots. Take your antiplatelet medicine exactly as directed. These medicines make it more likely for you to bleed or bruise. If you are told to take aspirin, do not take acetaminophen or ibuprofen instead. Take your medicine as directed. Contact your healthcare provider if you think your medicine is not helping or if you have side effects. Tell him or her if you are allergic to any medicine. Keep a list of the medicines, vitamins, and herbs you take. Include the amounts, and when and why you take them. Bring the list or the pill bottles to follow-up visits. Carry your medicine list with you in case of an emergency. Diabetes education:  You may have providers come to your house and teach you more about diabetes. You may instead attend classes with others who have diabetes. You will be taught the following:  About nutrition:  A dietitian will help you make a meal plan to keep your blood sugar level steady. You will learn how food affects your blood sugar levels. You will also learn to keep track of sugar and starchy foods (carbohydrates). Do not skip meals. Your blood sugar level may drop too low if you have taken diabetes medicine and do not eat. You may be taught to use the plate method when eating. The plate method will help with portion control. With the plate method, ½ of your plate contains vegetables. The other half is divided so that ¼ contains protein or meat, and ¼ contains starches, such as potatoes. Physical activity and diabetes: You will learn why physical activity, such as walking, is important. You and your care team provider will make a plan for your activity. Your care team provider will tell you what a healthy weight will be for you. He or she will help you make a plan to get to that weight and stay there. Maintain a healthy weight to help delay or prevent complications of diabetes. How to check your blood sugar level: You will learn what your blood sugar level should be. You will be given information on when to check your blood sugar level. You will learn what to do if your level is too high or too low. Write down the times of your checks and your levels. Take them to all follow-up appointments. If you need insulin:  You and your family members will be taught how to draw up and give insulin. You will learn how much insulin you need and what time to inject insulin. You will be taught when to not give insulin. Your education team will also teach you how to dispose of needles and syringes. Other ways to help manage type 2 diabetes:   Check your feet each day for sores. Wear shoes and socks that fit correctly. Do not trim your toenails. Ask your care team provider for more information about foot care. Do not smoke. Nicotine and other chemicals in cigarettes and cigars can cause lung damage and make diabetes harder to manage. Ask your care team provider for information if you currently smoke and need help to quit. E-cigarettes or smokeless tobacco still contain nicotine. Talk to your care team provider before you use these products. Check your blood pressure as directed. You may have high blood pressure when you have type 2 diabetes. Talk to your care team provider about your blood pressure goals. Together you can create a plan to lower your blood pressure if needed and keep it in a healthy range. The plan may include lifestyle changes or medicines.  A normal blood pressure is 119/79 or lower. A normal blood pressure can help prevent or delay certain complications from diabetes. Examples include retinopathy (eye damage) and kidney damage. Wear medical alert identification. Wear medical alert jewelry or carry a card that says you have diabetes. Ask your care team provider where to get these items. Ask about vaccines. You have a higher risk for serious illness if you get the flu, pneumonia, or hepatitis. Ask your care team provider if you should get a flu, pneumonia, or hepatitis B vaccine, and when to get the vaccine. Follow up with your diabetes care team providers as directed: You may need to follow up within 2 days. You may need to return to have your A1c checked every 3 months. You will need to return at least once each year to have your feet checked. You will need an eye exam once a year to check for retinopathy. You will also need urine tests every year to check for kidney problems. You may need tests to monitor for heart disease, such as an EKG, stress test, blood pressure monitoring, and blood tests. Write down your questions so you remember to ask them during your visits. © Copyright [facility] Information is for End User's use only and may not be sold, redistributed or otherwise used for commercial purposes. All illustrations and images included in CareNotes® are the copyrighted property of [employer] or [address] [place] [address]  The above information is an  only. It is not intended as medical advice for individual conditions or treatments. Talk to your doctor, nurse or pharmacist before following any medical regimen to see if it is safe and effective for you. Discussed with the patient and all questioned fully answered. He will call me if any problems arise.     TERRANCE Cuevas

## 2023-12-05 NOTE — TELEPHONE ENCOUNTER
Per pharmacy's phone call, Jeanee Deve has been discontinued. Please send new Rx for Accuchek Guide meter, test strips and lancets.

## 2023-12-05 NOTE — PATIENT INSTRUCTIONS
Start Metformin ER 500mg daily with dinner for 1 week. After 1 week if tolerating with no side effects, increase to 1 tab with breakfast and 1 tab with dinner. After 1 week if tolerating with no side effects, increase to 1 tab with breakfast, 2 tabs with dinner  After 1 week if tolerating with no side effects, increase to 2 tabs with breakfast, and 2 tabs with dinner. Notify office if having side effects         Type 2 Diabetes in Adults: New Diagnosis   WHAT YOU NEED TO KNOW:   Type 2 diabetes is a disease that affects how your body uses glucose (sugar). Normally, when the blood sugar level increases, the pancreas makes more insulin. Insulin helps move sugar out of the blood so it can be used for energy. Type 2 diabetes develops because either the body cannot make enough insulin, or it cannot use the insulin correctly. Type 2 diabetes can be controlled to prevent damage to your heart, blood vessels, and other organs. DISCHARGE INSTRUCTIONS:   Have someone call your local emergency number (911 in the [address]) if:   You have any of the following signs of a stroke:      Numbness or drooping on one side of your face     Weakness in an arm or leg    Confusion or difficulty speaking    Dizziness, a severe headache, or vision loss    You have any of the following signs of a heart attack:      Squeezing, pressure, or pain in your chest    You may  also have any of the following:     Discomfort or pain in your back, neck, jaw, stomach, or arm    Shortness of breath    Nausea or vomiting    Lightheadedness or a sudden cold sweat    Return to the emergency department if:  You have trouble breathing. You have severe abdominal pain. You vomit for more than 2 hours. You have trouble staying awake or focusing. You are shaking or sweating. You feel weak or more tired than usual.     You have blurred or double vision. Your breath has a fruity, sweet smell.     Call your doctor or care team if:   Your arms and legs are swollen. You have an upset stomach and cannot eat the foods on your meal plan. You feel dizzy, have headaches, or are easily irritated. Your skin is red, warm, dry, or swollen. You have a wound that does not heal.     You have numbness in your arms or legs. You have trouble coping with your illness, or you feel anxious or depressed. You have questions or concerns about your condition or care. Medicines: You may  need any of the following:  Diabetes medicines or insulin  may be given to decrease the amount of sugar in your blood. Blood pressure medicine  may be given to lower your blood pressure. Cholesterol-lowering medicine  may be given to prevent heart disease. Antiplatelets , such as aspirin, help prevent blood clots. Take your antiplatelet medicine exactly as directed. These medicines make it more likely for you to bleed or bruise. If you are told to take aspirin, do not take acetaminophen or ibuprofen instead. Take your medicine as directed. Contact your healthcare provider if you think your medicine is not helping or if you have side effects. Tell him or her if you are allergic to any medicine. Keep a list of the medicines, vitamins, and herbs you take. Include the amounts, and when and why you take them. Bring the list or the pill bottles to follow-up visits. Carry your medicine list with you in case of an emergency. Diabetes education:  You may have providers come to your house and teach you more about diabetes. You may instead attend classes with others who have diabetes. You will be taught the following:  About nutrition:  A dietitian will help you make a meal plan to keep your blood sugar level steady. You will learn how food affects your blood sugar levels. You will also learn to keep track of sugar and starchy foods (carbohydrates). Do not skip meals.  Your blood sugar level may drop too low if you have taken diabetes medicine and do not eat. You may be taught to use the plate method when eating. The plate method will help with portion control. With the plate method, ½ of your plate contains vegetables. The other half is divided so that ¼ contains protein or meat, and ¼ contains starches, such as potatoes. Physical activity and diabetes: You will learn why physical activity, such as walking, is important. You and your care team provider will make a plan for your activity. Your care team provider will tell you what a healthy weight will be for you. He or she will help you make a plan to get to that weight and stay there. Maintain a healthy weight to help delay or prevent complications of diabetes. How to check your blood sugar level: You will learn what your blood sugar level should be. You will be given information on when to check your blood sugar level. You will learn what to do if your level is too high or too low. Write down the times of your checks and your levels. Take them to all follow-up appointments. If you need insulin:  You and your family members will be taught how to draw up and give insulin. You will learn how much insulin you need and what time to inject insulin. You will be taught when to not give insulin. Your education team will also teach you how to dispose of needles and syringes. Other ways to help manage type 2 diabetes:   Check your feet each day for sores. Wear shoes and socks that fit correctly. Do not trim your toenails. Ask your care team provider for more information about foot care. Do not smoke. Nicotine and other chemicals in cigarettes and cigars can cause lung damage and make diabetes harder to manage. Ask your care team provider for information if you currently smoke and need help to quit. E-cigarettes or smokeless tobacco still contain nicotine. Talk to your care team provider before you use these products. Check your blood pressure as directed.   You may have high blood pressure when you have type 2 diabetes. Talk to your care team provider about your blood pressure goals. Together you can create a plan to lower your blood pressure if needed and keep it in a healthy range. The plan may include lifestyle changes or medicines. A normal blood pressure is 119/79 or lower. A normal blood pressure can help prevent or delay certain complications from diabetes. Examples include retinopathy (eye damage) and kidney damage. Wear medical alert identification. Wear medical alert jewelry or carry a card that says you have diabetes. Ask your care team provider where to get these items. Ask about vaccines. You have a higher risk for serious illness if you get the flu, pneumonia, or hepatitis. Ask your care team provider if you should get a flu, pneumonia, or hepatitis B vaccine, and when to get the vaccine. Follow up with your diabetes care team providers as directed: You may need to follow up within 2 days. You may need to return to have your A1c checked every 3 months. You will need to return at least once each year to have your feet checked. You will need an eye exam once a year to check for retinopathy. You will also need urine tests every year to check for kidney problems. You may need tests to monitor for heart disease, such as an EKG, stress test, blood pressure monitoring, and blood tests. Write down your questions so you remember to ask them during your visits. © Copyright [employer] Information is for End User's use only and may not be sold, redistributed or otherwise used for commercial purposes. All illustrations and images included in CareNotes® are the copyrighted property of [employer] or [address] [place] [address]  The above information is an  only. It is not intended as medical advice for individual conditions or treatments. Talk to your doctor, nurse or pharmacist before following any medical regimen to see if it is safe and effective for you.

## 2023-12-05 NOTE — ASSESSMENT & PLAN NOTE
LDL at goal, triglycerides elevated. Continue on statin therapy and recommend he focus on lifestyle modifications.

## 2023-12-09 ENCOUNTER — APPOINTMENT (OUTPATIENT)
Dept: LAB | Facility: CLINIC | Age: 58
End: 2023-12-09
Payer: COMMERCIAL

## 2023-12-09 DIAGNOSIS — E11.65 UNCONTROLLED TYPE 2 DIABETES MELLITUS WITH HYPERGLYCEMIA (HCC): ICD-10-CM

## 2023-12-09 PROCEDURE — 86337 INSULIN ANTIBODIES: CPT

## 2023-12-09 PROCEDURE — 86341 ISLET CELL ANTIBODY: CPT

## 2023-12-09 PROCEDURE — 84681 ASSAY OF C-PEPTIDE: CPT

## 2023-12-09 PROCEDURE — 36415 COLL VENOUS BLD VENIPUNCTURE: CPT

## 2023-12-09 PROCEDURE — 83519 RIA NONANTIBODY: CPT

## 2023-12-11 ENCOUNTER — OFFICE VISIT (OUTPATIENT)
Dept: INTERNAL MEDICINE CLINIC | Facility: CLINIC | Age: 58
End: 2023-12-11
Payer: COMMERCIAL

## 2023-12-11 VITALS
HEIGHT: 69 IN | SYSTOLIC BLOOD PRESSURE: 130 MMHG | WEIGHT: 162 LBS | HEART RATE: 88 BPM | TEMPERATURE: 97.5 F | OXYGEN SATURATION: 95 % | BODY MASS INDEX: 23.99 KG/M2 | DIASTOLIC BLOOD PRESSURE: 84 MMHG

## 2023-12-11 DIAGNOSIS — Z23 ENCOUNTER FOR IMMUNIZATION: ICD-10-CM

## 2023-12-11 DIAGNOSIS — E03.9 ACQUIRED HYPOTHYROIDISM: ICD-10-CM

## 2023-12-11 DIAGNOSIS — K21.9 GASTROESOPHAGEAL REFLUX DISEASE WITHOUT ESOPHAGITIS: ICD-10-CM

## 2023-12-11 DIAGNOSIS — E11.65 UNCONTROLLED TYPE 2 DIABETES MELLITUS WITH HYPERGLYCEMIA (HCC): Primary | ICD-10-CM

## 2023-12-11 DIAGNOSIS — E78.2 MIXED HYPERLIPIDEMIA: ICD-10-CM

## 2023-12-11 PROCEDURE — 90471 IMMUNIZATION ADMIN: CPT

## 2023-12-11 PROCEDURE — 99214 OFFICE O/P EST MOD 30 MIN: CPT | Performed by: INTERNAL MEDICINE

## 2023-12-11 PROCEDURE — 90677 PCV20 VACCINE IM: CPT

## 2023-12-11 RX ORDER — PRAVASTATIN SODIUM 40 MG
40 TABLET ORAL
Qty: 90 TABLET | Refills: 1 | Status: SHIPPED | OUTPATIENT
Start: 2023-12-11

## 2023-12-11 NOTE — PROGRESS NOTES
Assessment/Plan:             1. Uncontrolled type 2 diabetes mellitus with hyperglycemia ([address])    2. Acquired hypothyroidism    3. Mixed hyperlipidemia  -     pravastatin (PRAVACHOL) 40 mg tablet; Take 1 tablet (40 mg total) by mouth daily at bedtime    4. Encounter for immunization  -     Pneumococcal Conjugate Vaccine 20-valent (Pcv20)    5. Gastroesophageal reflux disease without esophagitis           Subjective:      Patient ID: Agustin Walton is a 59 y.o. male. Follow-up on multiple medical problems to ensure they are stable on current medications        The following portions of the patient's history were reviewed and updated as appropriate: He  has a past medical history of GERD (gastroesophageal reflux disease), Hepatitis C, Hypertriglyceridemia, Impaired fasting glucose, and Throat cancer ([address]). He   Patient Active Problem List    Diagnosis Date Noted    Uncontrolled type 2 diabetes mellitus with hyperglycemia ([address]) 11/15/2023    Type 2 diabetes, HbA1c goal < 7% ([address]) 12/08/2022    COVID-19 virus detected 11/10/2022    Plantar fasciitis, right 10/17/2022    Tobacco use 03/17/2021    Acquired hypothyroidism 01/26/2021    Mixed hyperlipidemia 01/26/2021    Throat cancer ([facility])     Impaired fasting glucose     Hypertriglyceridemia     GERD (gastroesophageal reflux disease)     Chronic hepatitis C without hepatic coma ([address])      He  has a past surgical history that includes Knee surgery; Replacement total knee (Right); PEG tube placement; PEG tube removal; TRACHEOSTOMY; and Multiple tooth extractions. His family history includes Diabetes type I in his paternal grandmother; Heart disease in his father; Hypertension in his father; Lung cancer in his sister; Thyroid disease unspecified in his mother. He  reports that he quit smoking about 12 years ago. His smoking use included cigarettes. He started smoking about 48 years ago. He has a 20.00 pack-year smoking history.  He has never used smokeless tobacco. He reports current alcohol use of about 1.0 standard drink of alcohol per week. He reports that he does not use drugs. Current Outpatient Medications   Medication Sig Dispense Refill    Accu-Chek FastClix Lancets MISC Check BGs 3-4 times daily. 100 each 1    Blood Glucose Monitoring Suppl (Accu-Chek Bettye Plus) w/Device KIT Check BG 3-4 times daily. 1 kit 0    Continuous Blood Gluc  (FreeStyle [place] 2 [place]) NISHA Apply 1 Application topically every 14 (fourteen) days 1 each 0    Continuous Blood Gluc Sensor (FreeStyle Reyna 2 Sensor) MISC Use 1 Application every 14 (fourteen) days 6 each 2    glucose blood (Accu-Chek Bettye Plus) test strip USE TO CHECK BLOOD SUGAR THREE TO FOUR TIMES A  strip 2    ketoconazole (NIZORAL) 2 % cream Apply topically daily 15 g 0    metFORMIN (GLUCOPHAGE-XR) 500 mg 24 hr tablet Metformin Instructions: 1 tab daily with supper x 1 week 1 tab twice per day with food x 1 week 1 tab breakfast, 2 tabs dinner x 1 week 2 tabs with breakfast and supper. 90 tablet 1    omeprazole (PriLOSEC) 40 MG capsule Take 1 capsule (40 mg total) by mouth daily 90 capsule 1    pravastatin (PRAVACHOL) 40 mg tablet Take 1 tablet (40 mg total) by mouth daily at bedtime 90 tablet 1    Synthroid 88 MCG tablet Take 1 tablet (88 mcg total) by mouth daily 90 tablet 1     No current facility-administered medications for this visit. Current Outpatient Medications on File Prior to Visit   Medication Sig    Accu-Chek FastClix Lancets MISC Check BGs 3-4 times daily. Blood Glucose Monitoring Suppl (Accu-Chek Bettye Plus) w/Device KIT Check BG 3-4 times daily.     Continuous Blood Gluc  (FreeStyle [place] 2 [place]) NISHA Apply 1 Application topically every 14 (fourteen) days    Continuous Blood Gluc Sensor (FreeStyle Reyna 2 Sensor) MISC Use 1 Application every 14 (fourteen) days    glucose blood (Accu-Chek Bettye Plus) test strip USE TO CHECK BLOOD SUGAR THREE TO FOUR TIMES A DAY    ketoconazole (NIZORAL) 2 % cream Apply topically daily    metFORMIN (GLUCOPHAGE-XR) 500 mg 24 hr tablet Metformin Instructions: 1 tab daily with supper x 1 week 1 tab twice per day with food x 1 week 1 tab breakfast, 2 tabs dinner x 1 week 2 tabs with breakfast and supper. omeprazole (PriLOSEC) 40 MG capsule Take 1 capsule (40 mg total) by mouth daily    Synthroid 88 MCG tablet Take 1 tablet (88 mcg total) by mouth daily    [DISCONTINUED] pravastatin (PRAVACHOL) 40 mg tablet Take 1 tablet (40 mg total) by mouth daily at bedtime     No current facility-administered medications on file prior to visit. He is allergic to penicillins. .    Review of Systems   Constitutional:  Negative for chills and fever. HENT:  Negative for congestion, ear pain and sore throat. Eyes:  Negative for pain. Respiratory:  Negative for cough and shortness of breath. Cardiovascular:  Negative for chest pain and leg swelling. Gastrointestinal:  Negative for abdominal pain, nausea and vomiting. Endocrine: Negative for polyuria. Genitourinary:  Negative for difficulty urinating, frequency and urgency. Musculoskeletal:  Negative for arthralgias and back pain. Skin:  Negative for rash. Neurological:  Negative for weakness and headaches. Psychiatric/Behavioral:  Negative for sleep disturbance. The patient is not nervous/anxious.           Objective:      /84 (BP Location: Left arm, Patient Position: Sitting, Cuff Size: Standard)   Pulse 88   Temp 97.5 °F (36.4 °C) (Temporal)   Ht 5' 9" (1.753 m)   Wt 73.5 kg (162 lb)   SpO2 95%   BMI 23.92 kg/m²     Recent Results (from the past 1344 hour(s))   Microalbumin / creatinine urine ratio    Collection Time: 11/04/23  8:26 AM   Result Value Ref Range    Creatinine, Ur 100.8 Reference range not established. mg/dL    Albumin,U,Random 80.8 (H) <20.0 mg/L    Albumin Creat Ratio 80 (H) 0 - 30 mg/g creatinine   TSH, 3rd generation    Collection Time: 11/04/23  8:26 AM   Result Value Ref Range    TSH 3RD GENERATON 4.410 0.450 - 4.500 uIU/mL   T4, free    Collection Time: 11/04/23  8:26 AM   Result Value Ref Range    Free T4 0.73 0.61 - 1.12 ng/dL   CBC and differential    Collection Time: 11/04/23  8:26 AM   Result Value Ref Range    WBC 7.32 4.31 - 10.16 Thousand/uL    RBC 5.31 3.88 - 5.62 Million/uL    Hemoglobin 17.9 (H) 12.0 - 17.0 g/dL    Hematocrit 48.3 36.5 - 49.3 %    MCV 91 82 - 98 fL    MCH 33.7 26.8 - 34.3 pg    MCHC 37.1 31.4 - 37.4 g/dL    RDW 11.6 11.6 - 15.1 %    MPV 11.8 8.9 - 12.7 fL    Platelets 376 508 - 216 Thousands/uL    nRBC 0 /100 WBCs    Neutrophils Relative 64 43 - 75 %    Immat GRANS % 0 0 - 2 %    Lymphocytes Relative 24 14 - 44 %    Monocytes Relative 9 4 - 12 %    Eosinophils Relative 2 0 - 6 %    Basophils Relative 1 0 - 1 %    Neutrophils Absolute 4.70 1.85 - 7.62 Thousands/µL    Immature Grans Absolute 0.01 0.00 - 0.20 Thousand/uL    Lymphocytes Absolute 1.74 0.60 - 4.47 Thousands/µL    Monocytes Absolute 0.67 0.17 - 1.22 Thousand/µL    Eosinophils Absolute 0.13 0.00 - 0.61 Thousand/µL    Basophils Absolute 0.07 0.00 - 0.10 Thousands/µL   Comprehensive metabolic panel    Collection Time: 11/04/23  8:26 AM   Result Value Ref Range    Sodium 140 135 - 147 mmol/L    Potassium 3.8 3.5 - 5.3 mmol/L    Chloride 100 96 - 108 mmol/L    CO2 33 (H) 21 - 32 mmol/L    ANION GAP 7 mmol/L    BUN 13 5 - 25 mg/dL    Creatinine 0.96 0.60 - 1.30 mg/dL    Glucose, Fasting 343 (H) 65 - 99 mg/dL    Calcium 9.9 8.4 - 10.2 mg/dL    AST 14 13 - 39 U/L    ALT 30 7 - 52 U/L    Alkaline Phosphatase 97 34 - 104 U/L    Total Protein 7.0 6.4 - 8.4 g/dL    Albumin 4.6 3.5 - 5.0 g/dL    Total Bilirubin 0.90 0.20 - 1.00 mg/dL    eGFR 85 ml/min/1.73sq m   Hemoglobin A1C    Collection Time: 11/04/23  8:26 AM   Result Value Ref Range    Hemoglobin A1C 10.8 (H) Normal 4.0-5.6%; PreDiabetic 5.7-6.4%;  Diabetic >=6.5%; Glycemic control for adults with diabetes <7.0% %     mg/dl   Lipid Panel with Direct LDL reflex    Collection Time: 11/04/23  8:26 AM   Result Value Ref Range    Cholesterol 105 See Comment mg/dL    Triglycerides 201 (H) See Comment mg/dL    HDL, Direct 30 (L) >=40 mg/dL    LDL Calculated 35 0 - 100 mg/dL        Physical Exam  Constitutional:       Appearance: Normal appearance. HENT:      Head: Normocephalic. Right Ear: Tympanic membrane, ear canal and external ear normal.      Left Ear: Tympanic membrane, ear canal and external ear normal.      Nose: Nose normal. No congestion. Mouth/Throat:      Mouth: Mucous membranes are moist.      Pharynx: Oropharynx is clear. No oropharyngeal exudate or posterior oropharyngeal erythema. Eyes:      Extraocular Movements: Extraocular movements intact. Conjunctiva/sclera: Conjunctivae normal.   Cardiovascular:      Rate and Rhythm: Normal rate and regular rhythm. Heart sounds: Normal heart sounds. No murmur heard. Pulmonary:      Effort: Pulmonary effort is normal.      Breath sounds: Normal breath sounds. No wheezing or rales. Abdominal:      General: Abdomen is flat. There is no distension. Palpations: Abdomen is soft. Tenderness: There is no abdominal tenderness. Musculoskeletal:         General: Normal range of motion. Cervical back: Normal range of motion and neck supple. Right lower leg: No edema. Left lower leg: No edema. Lymphadenopathy:      Cervical: No cervical adenopathy. Skin:     General: Skin is warm. Neurological:      General: No focal deficit present. Mental Status: He is alert and oriented to person, place, and time.

## 2023-12-12 LAB
C PEPTIDE SERPL-MCNC: 1.8 NG/ML (ref 1.1–4.4)
PANC ISLET CELL AB TITR SER: NEGATIVE {TITER}

## 2023-12-13 LAB — GAD65 AB SER-ACNC: <5 U/ML (ref 0–5)

## 2023-12-16 LAB — ISLET CELL512 AB SER-ACNC: <7.5 U/ML

## 2023-12-17 LAB — INSULIN AB SER-ACNC: <5 UU/ML

## 2023-12-21 ENCOUNTER — TELEPHONE (OUTPATIENT)
Dept: ENDOCRINOLOGY | Facility: CLINIC | Age: 58
End: 2023-12-21

## 2023-12-21 NOTE — TELEPHONE ENCOUNTER
----- Message from TERRANCE Douglas sent at 12/15/2023  1:12 PM EST -----  So far antibodies are negative, confirming diagnosis of type 2 diabetes. Some labs still pending.

## 2024-01-03 ENCOUNTER — TELEPHONE (OUTPATIENT)
Dept: ENDOCRINOLOGY | Facility: CLINIC | Age: 59
End: 2024-01-03

## 2024-01-03 NOTE — TELEPHONE ENCOUNTER
Ricarda called in to request additional metformin she said pt been taking 4 tablets a day and is running out they are requesting 120 tablets for a 30 day advise will send msg to provider

## 2024-01-05 DIAGNOSIS — E11.65 UNCONTROLLED TYPE 2 DIABETES MELLITUS WITH HYPERGLYCEMIA (HCC): ICD-10-CM

## 2024-01-05 RX ORDER — METFORMIN HYDROCHLORIDE 500 MG/1
1000 TABLET, EXTENDED RELEASE ORAL 2 TIMES DAILY WITH MEALS
Qty: 360 TABLET | Refills: 1 | Status: SHIPPED | OUTPATIENT
Start: 2024-01-05

## 2024-01-18 LAB — MISCELLANEOUS LAB TEST RESULT: NORMAL

## 2024-01-31 ENCOUNTER — OFFICE VISIT (OUTPATIENT)
Dept: DIABETES SERVICES | Facility: CLINIC | Age: 59
End: 2024-01-31
Payer: COMMERCIAL

## 2024-01-31 DIAGNOSIS — E11.65 UNCONTROLLED TYPE 2 DIABETES MELLITUS WITH HYPERGLYCEMIA (HCC): Primary | ICD-10-CM

## 2024-01-31 PROCEDURE — 98960 EDU&TRN PT SELF-MGMT NQHP 1: CPT

## 2024-01-31 NOTE — PROGRESS NOTES
Personal Reyna Training    Met with Pantera JACOB Eloisa today for a personal Reyna 2 training. Pantera brought his own supplies to the visit which include a reader and sensors.    Educator reviewed the following:    -- Skin Prep  -- How to place the Reyna  -- Importance of site rotation  -- Reyna 2 set alerts  -- How to scan for a reading  -- 60 minute warm-up  -- If reading doesn't match symptoms, do a finger stick  -- Return in 14 days for download and change sensor    Lab Results   Component Value Date    HGBA1C 10.8 (H) 11/04/2023         Patient response to instruction    Comprehension: good  Motivation: good  Expected Compliance: good  Response to Teachback: 100%, demonstrated understanding    Thank you for referring your patient to [facility], it was a pleasure working with them today. Please feel free to call with any questions or concerns.    Jhoana Monteiro, Msc, MPH, RDN, LDN  [address] [place] [address]

## 2024-02-01 ENCOUNTER — TELEPHONE (OUTPATIENT)
Dept: DIABETES SERVICES | Facility: CLINIC | Age: 59
End: 2024-02-01

## 2024-02-20 ENCOUNTER — OFFICE VISIT (OUTPATIENT)
Dept: ENDOCRINOLOGY | Facility: CLINIC | Age: 59
End: 2024-02-20
Payer: COMMERCIAL

## 2024-02-20 VITALS
HEIGHT: 69 IN | DIASTOLIC BLOOD PRESSURE: 90 MMHG | HEART RATE: 85 BPM | BODY MASS INDEX: 24.2 KG/M2 | SYSTOLIC BLOOD PRESSURE: 136 MMHG | WEIGHT: 163.4 LBS

## 2024-02-20 DIAGNOSIS — E03.9 ACQUIRED HYPOTHYROIDISM: ICD-10-CM

## 2024-02-20 DIAGNOSIS — E11.65 UNCONTROLLED TYPE 2 DIABETES MELLITUS WITH HYPERGLYCEMIA (HCC): Primary | ICD-10-CM

## 2024-02-20 DIAGNOSIS — E78.2 MIXED HYPERLIPIDEMIA: ICD-10-CM

## 2024-02-20 LAB — SL AMB POCT HEMOGLOBIN AIC: 7.6 (ref ?–6.5)

## 2024-02-20 PROCEDURE — 95251 CONT GLUC MNTR ANALYSIS I&R: CPT

## 2024-02-20 PROCEDURE — 83036 HEMOGLOBIN GLYCOSYLATED A1C: CPT

## 2024-02-20 PROCEDURE — 99214 OFFICE O/P EST MOD 30 MIN: CPT

## 2024-02-20 RX ORDER — BLOOD-GLUCOSE SENSOR
EACH MISCELLANEOUS
Qty: 2 EACH | Refills: 3 | Status: SHIPPED | OUTPATIENT
Start: 2024-02-20

## 2024-02-20 RX ORDER — BLOOD-GLUCOSE METER
EACH MISCELLANEOUS
Qty: 1 KIT | Refills: 0 | Status: SHIPPED | OUTPATIENT
Start: 2024-02-20

## 2024-02-20 RX ORDER — BLOOD SUGAR DIAGNOSTIC
STRIP MISCELLANEOUS
Qty: 100 STRIP | Refills: 2 | Status: SHIPPED | OUTPATIENT
Start: 2024-02-20

## 2024-02-20 NOTE — PROGRESS NOTES
Established Patient Progress Note      Chief Complaint   Patient presents with    Diabetes Type 2        Impression & Plan:    Problem List Items Addressed This Visit          Endocrine    Acquired hypothyroidism     Continue current dose of synthroid. He is complaining of new onset fatigue in the past 3 weeks - check thyroid function.          Relevant Orders    TSH, 3rd generation with Free T4 reflex    Uncontrolled type 2 diabetes mellitus with hyperglycemia (HCC) - Primary     HGA1C improving, close to goal. BGs well controlled, in range 92% of the time.   Treatment regimen:   - Continue with current medication regimen.  - Upgrade to Reyna 3 to monitor BGs.  - Recommend he meet with MNT and education for glucometer training.   Discussed risks/complications associated with uncontrolled diabetes.    Advised to adhere to diabetic diet, and recommended staying active/exercising routinely.  Keep carbohydrates consistent to limit blood glucose fluctuations.  Advised to call if blood sugars less than 70 mg/dl or over 300 mg/dl.   Discussed symptoms and treatment of hypoglycemia.    Recommended routine follow-up with podiatry and ophthalmology.   Ordered blood work to complete prior to next visit.   Lab Results   Component Value Date    HGBA1C 7.6 (A) 02/20/2024            Relevant Medications    Continuous Blood Gluc Sensor (FreeStyle Reyna 3 Sensor) MISC    Blood Glucose Monitoring Suppl (Accu-Chek Bettye Plus) w/Device KIT    glucose blood (Accu-Chek Bettye Plus) test strip    Other Relevant Orders    POCT hemoglobin A1c (Completed)    Ambulatory referral to Diabetic Education    Hemoglobin A1C    Comprehensive metabolic panel       Other    Mixed hyperlipidemia     LDL at goal - continue stain.             History of Present Illness:   Pantera Jimenez is a 58 y.o. male with type 2 diabetes seen in follow up. Reports complications of none. Denies recent severe hypoglycemic or severe hyperglycemic episodes. Denies  any issues with his current regimen. POCT A1C was 7.6. Home glucose monitoring: are performed regularly with  CGM.     Pantera CECIL [place]   Device used: Jianjian 2   Home use   Indication: Type 2 Diabetes  More than 72 hours of data was reviewed. Report to be scanned to chart.   Date Range: 24-24  Analysis of data:   Average Glucose: 139  Coefficient of Variation: 22.3%    Time in Target Range: 92%   Time Above Range: 8% high   Time Below Range: 0%   Interpretation of data:  BGs very well controlled.      Current regimen:   Metformin 1,000 mg twice daily with meals     Last Eye Exam: UTD  Last Foot Exam: UTD    Has hyperlipidemia: Taking pravastatin     Thyroid disorders: Taking Synthroid 88 mcg daily     Patient Active Problem List   Diagnosis    Throat cancer (HCC)    Impaired fasting glucose    Hypertriglyceridemia    GERD (gastroesophageal reflux disease)    Chronic hepatitis C without hepatic coma (HCC)    Acquired hypothyroidism    Mixed hyperlipidemia    Tobacco use    Plantar fasciitis, right    COVID-19 virus detected    Type 2 diabetes, HbA1c goal < 7% (HCC)    Uncontrolled type 2 diabetes mellitus with hyperglycemia (HCC)      Past Medical History:   Diagnosis Date    GERD (gastroesophageal reflux disease)     Hepatitis C     Hypertriglyceridemia     Impaired fasting glucose     Throat cancer (HCC)       Past Surgical History:   Procedure Laterality Date    KNEE SURGERY      MULTIPLE TOOTH EXTRACTIONS      PEG TUBE PLACEMENT      PEG TUBE REMOVAL      REPLACEMENT TOTAL KNEE Right     TRACHEOSTOMY        Social History     Tobacco Use    Smoking status: Former     Current packs/day: 0.00     Average packs/day: 1 pack/day for 35.7 years (35.7 ttl pk-yrs)     Types: Cigarettes     Start date: 1975     Quit date: 2011     Years since quittin.0    Smokeless tobacco: Never    Tobacco comments:     Quit 12 years ago   Substance Use Topics    Alcohol use: Yes     Alcohol/week: 1.0 standard  drink of alcohol     Types: 1 Glasses of wine per week     Comment: Rare. Not every week     Allergies   Allergen Reactions    Penicillins          Current Outpatient Medications:     Accu-Chek FastClix Lancets MISC, Check BGs 3-4 times daily., Disp: 100 each, Rfl: 1    Blood Glucose Monitoring Suppl (Accu-Chek Bettye Plus) w/Device KIT, Check BG 3-4 times daily., Disp: 1 kit, Rfl: 0    Continuous Blood Gluc  (FreeStyle Reyna 2 [place]) NISHA, Apply 1 Application topically every 14 (fourteen) days, Disp: 1 each, Rfl: 0    Continuous Blood Gluc Sensor (FreeStyle Reyna 3 Sensor) MISC, Change once every 14 days., Disp: 2 each, Rfl: 3    glucose blood (Accu-Chek Bettye Plus) test strip, USE TO CHECK BLOOD SUGAR THREE TO FOUR TIMES A DAY, Disp: 100 strip, Rfl: 2    metFORMIN (GLUCOPHAGE-XR) 500 mg 24 hr tablet, Take 2 tablets (1,000 mg total) by mouth 2 (two) times a day with meals, Disp: 360 tablet, Rfl: 1    omeprazole (PriLOSEC) 40 MG capsule, Take 1 capsule (40 mg total) by mouth daily, Disp: 90 capsule, Rfl: 1    pravastatin (PRAVACHOL) 40 mg tablet, Take 1 tablet (40 mg total) by mouth daily at bedtime, Disp: 90 tablet, Rfl: 1    Synthroid 88 MCG tablet, Take 1 tablet (88 mcg total) by mouth daily, Disp: 90 tablet, Rfl: 1    ketoconazole (NIZORAL) 2 % cream, Apply topically daily (Patient not taking: Reported on 2/20/2024), Disp: 15 g, Rfl: 0    Review of Systems   Constitutional:  Positive for fatigue. Negative for activity change, appetite change, chills, diaphoresis, fever and unexpected weight change.   Eyes:  Negative for visual disturbance.   Respiratory:  Negative for chest tightness and shortness of breath.    Cardiovascular:  Negative for chest pain, palpitations and leg swelling.   Gastrointestinal:  Negative for abdominal pain, constipation, diarrhea, nausea and vomiting.   Endocrine: Negative for polydipsia, polyphagia and polyuria.   Skin:  Negative for color change, pallor, rash and wound.  "  Neurological:  Negative for dizziness, tremors, weakness, light-headedness and numbness.   All other systems reviewed and are negative.      Physical Exam:  Body mass index is 24.13 kg/m².  /90   Pulse 85   Ht 5' 9\" (1.753 m)   Wt 74.1 kg (163 lb 6.4 oz)   BMI 24.13 kg/m²    Wt Readings from Last 3 Encounters:   02/20/24 74.1 kg (163 lb 6.4 oz)   12/11/23 73.5 kg (162 lb)   12/05/23 75.5 kg (166 lb 6.4 oz)       Physical Exam  Vitals reviewed.   Constitutional:       Appearance: Normal appearance. He is normal weight.   Neurological:      Mental Status: He is alert and oriented to person, place, and time.   Psychiatric:         Mood and Affect: Mood normal.         Thought Content: Thought content normal.       Labs:   Lab Results   Component Value Date    HGBA1C 7.6 (A) 02/20/2024    HGBA1C 10.8 (H) 11/04/2023    HGBA1C 6.8 (H) 04/07/2023     Lab Results   Component Value Date    CREATININE 0.96 11/04/2023    CREATININE 0.81 11/23/2022    CREATININE 0.88 01/14/2022    BUN 13 11/04/2023     08/01/2015    K 3.8 11/04/2023     11/04/2023    CO2 33 (H) 11/04/2023     eGFR   Date Value Ref Range Status   11/04/2023 85 ml/min/1.73sq m Final     Lab Results   Component Value Date    CHOL 175 05/17/2014    HDL 30 (L) 11/04/2023    TRIG 201 (H) 11/04/2023     Lab Results   Component Value Date    ALT 30 11/04/2023    AST 14 11/04/2023    ALKPHOS 97 11/04/2023    BILITOT 0.69 08/01/2015     Lab Results   Component Value Date    YRU6FLNJODDU 4.410 11/04/2023    JSR0VMPYYJKQ 4.760 (H) 11/23/2022    JEM9NTSSTRFR 4.220 10/13/2022       There are no Patient Instructions on file for this visit.    Discussed with the patient and all questioned fully answered. He will call me if any problems arise.    TERRANCE Hewitt  "

## 2024-02-20 NOTE — ASSESSMENT & PLAN NOTE
HGA1C improving, close to goal. BGs well controlled, in range 92% of the time.   Treatment regimen:   - Continue with current medication regimen.  - Upgrade to Reyna 3 to monitor BGs.  - Recommend he meet with MNT and education for glucometer training.   Discussed risks/complications associated with uncontrolled diabetes.    Advised to adhere to diabetic diet, and recommended staying active/exercising routinely.  Keep carbohydrates consistent to limit blood glucose fluctuations.  Advised to call if blood sugars less than 70 mg/dl or over 300 mg/dl.   Discussed symptoms and treatment of hypoglycemia.    Recommended routine follow-up with podiatry and ophthalmology.   Ordered blood work to complete prior to next visit.   Lab Results   Component Value Date    HGBA1C 7.6 (A) 02/20/2024

## 2024-02-20 NOTE — ASSESSMENT & PLAN NOTE
Continue current dose of synthroid. He is complaining of new onset fatigue in the past 3 weeks - check thyroid function.

## 2024-02-23 DIAGNOSIS — E03.9 ACQUIRED HYPOTHYROIDISM: ICD-10-CM

## 2024-02-23 DIAGNOSIS — K21.9 GASTROESOPHAGEAL REFLUX DISEASE WITHOUT ESOPHAGITIS: ICD-10-CM

## 2024-02-23 RX ORDER — OMEPRAZOLE 40 MG/1
40 CAPSULE, DELAYED RELEASE ORAL DAILY
Qty: 90 CAPSULE | Refills: 1 | Status: SHIPPED | OUTPATIENT
Start: 2024-02-23

## 2024-02-23 RX ORDER — LEVOTHYROXINE SODIUM 88 MCG
88 TABLET ORAL DAILY
Qty: 90 TABLET | Refills: 1 | Status: SHIPPED | OUTPATIENT
Start: 2024-02-23

## 2024-02-29 ENCOUNTER — RA CDI HCC (OUTPATIENT)
Dept: OTHER | Facility: HOSPITAL | Age: 59
End: 2024-02-29

## 2024-03-14 ENCOUNTER — OFFICE VISIT (OUTPATIENT)
Dept: INTERNAL MEDICINE CLINIC | Facility: CLINIC | Age: 59
End: 2024-03-14
Payer: COMMERCIAL

## 2024-03-14 VITALS
BODY MASS INDEX: 23.85 KG/M2 | TEMPERATURE: 98.6 F | OXYGEN SATURATION: 97 % | DIASTOLIC BLOOD PRESSURE: 80 MMHG | SYSTOLIC BLOOD PRESSURE: 128 MMHG | HEART RATE: 82 BPM | HEIGHT: 69 IN | WEIGHT: 161 LBS

## 2024-03-14 DIAGNOSIS — K21.9 GASTROESOPHAGEAL REFLUX DISEASE WITHOUT ESOPHAGITIS: ICD-10-CM

## 2024-03-14 DIAGNOSIS — F17.210 SMOKING GREATER THAN 25 PACK YEARS: ICD-10-CM

## 2024-03-14 DIAGNOSIS — E53.8 B12 DEFICIENCY: ICD-10-CM

## 2024-03-14 DIAGNOSIS — E78.2 MIXED HYPERLIPIDEMIA: ICD-10-CM

## 2024-03-14 DIAGNOSIS — Z11.59 NEED FOR HEPATITIS C SCREENING TEST: ICD-10-CM

## 2024-03-14 DIAGNOSIS — E11.65 UNCONTROLLED TYPE 2 DIABETES MELLITUS WITH HYPERGLYCEMIA (HCC): Primary | ICD-10-CM

## 2024-03-14 DIAGNOSIS — C14.0 THROAT CANCER (HCC): ICD-10-CM

## 2024-03-14 DIAGNOSIS — E03.9 ACQUIRED HYPOTHYROIDISM: ICD-10-CM

## 2024-03-14 PROCEDURE — 99214 OFFICE O/P EST MOD 30 MIN: CPT | Performed by: INTERNAL MEDICINE

## 2024-03-14 NOTE — PROGRESS NOTES
Diabetic Foot Exam    Patient's shoes and socks removed.    Right Foot/Ankle   Right Foot Inspection  Skin Exam: skin normal, callus and callus. Skin not intact, no dry skin, no warmth, no erythema, no maceration, no abnormal color, no pre-ulcer and no ulcer.     Toe Exam: ROM and strength within normal limits. No swelling, no tenderness, erythema and  no right toe deformity    Sensory   Monofilament testing: intact    Vascular  Capillary refills: < 3 seconds  The right DP pulse is 2+. The right PT pulse is 2+.     Left Foot/Ankle  Left Foot Inspection  Skin Exam: skin normal and callus. Skin not intact, no dry skin, no warmth, no erythema, no maceration, normal color, no pre-ulcer and no ulcer.     Toe Exam: ROM and strength within normal limits. No swelling, no tenderness, no erythema and no left toe deformity.     Sensory   Monofilament testing: intact    Vascular  Capillary refills: < 3 seconds  The left DP pulse is 2+. The left PT pulse is 2+.     Assign Risk Category  No deformity present  No loss of protective sensation  No weak pulses  Risk: 0

## 2024-03-14 NOTE — PROGRESS NOTES
Assessment/Plan:             1. Uncontrolled type 2 diabetes mellitus with hyperglycemia (HCC)  -     CBC and differential; Future  -     Comprehensive metabolic panel; Future  -     Lipid Panel with Direct LDL reflex; Future  -     TSH, 3rd generation; Future  -     Albumin / creatinine urine ratio; Future  -     Hemoglobin A1C; Future    2. Acquired hypothyroidism  -     TSH, 3rd generation; Future    3. Throat cancer (HCC)    4. Mixed hyperlipidemia  -     Comprehensive metabolic panel; Future  -     Lipid Panel with Direct LDL reflex; Future  -     TSH, 3rd generation; Future    5. Gastroesophageal reflux disease without esophagitis    6. B12 deficiency  -     Vitamin B12; Future    7. Need for hepatitis C screening test  -     Hepatitis C antibody; Future    8. Smoking greater than 25 pack years  -     CT lung screening program; Future; Expected date: 03/14/2024           Subjective:      Patient ID: Pantera Jimenez is a 58 y.o. male.    Follow-up on multiple medical problems to ensure they are stable on current medications        The following portions of the patient's history were reviewed and updated as appropriate: He  has a past medical history of GERD (gastroesophageal reflux disease), Hepatitis C, Hypertriglyceridemia, Impaired fasting glucose, and Throat cancer (HCC).  He   Patient Active Problem List    Diagnosis Date Noted    B12 deficiency 03/14/2024    Uncontrolled type 2 diabetes mellitus with hyperglycemia (HCC) 11/15/2023    Type 2 diabetes, HbA1c goal < 7% (HCC) 12/08/2022    COVID-19 virus detected 11/10/2022    Plantar fasciitis, right 10/17/2022    Tobacco use 03/17/2021    Acquired hypothyroidism 01/26/2021    Mixed hyperlipidemia 01/26/2021    Throat cancer (HCC)     Impaired fasting glucose     Hypertriglyceridemia     GERD (gastroesophageal reflux disease)      He  has a past surgical history that includes Knee surgery; Replacement total knee (Right); PEG tube placement; PEG tube  removal; TRACHEOSTOMY; and Multiple tooth extractions.  His family history includes Diabetes type I in his paternal grandmother; Heart disease in his father; Hypertension in his father; Lung cancer in his sister; Thyroid disease unspecified in his mother.  He  reports that he quit smoking about 13 years ago. His smoking use included cigarettes. He started smoking about 48 years ago. He has a 35.7 pack-year smoking history. He has never been exposed to tobacco smoke. He has never used smokeless tobacco. He reports current alcohol use of about 1.0 standard drink of alcohol per week. He reports that he does not use drugs.  Current Outpatient Medications   Medication Sig Dispense Refill    Continuous Blood Gluc Sensor (FreeStyle Reyna 3 Sensor) MISC Change once every 14 days. 2 each 3    metFORMIN (GLUCOPHAGE-XR) 500 mg 24 hr tablet Take 2 tablets (1,000 mg total) by mouth 2 (two) times a day with meals 360 tablet 1    omeprazole (PriLOSEC) 40 MG capsule Take 1 capsule (40 mg total) by mouth daily 90 capsule 1    pravastatin (PRAVACHOL) 40 mg tablet Take 1 tablet (40 mg total) by mouth daily at bedtime 90 tablet 1    Synthroid 88 MCG tablet Take 1 tablet (88 mcg total) by mouth daily 90 tablet 1    ketoconazole (NIZORAL) 2 % cream Apply topically daily (Patient not taking: Reported on 2/20/2024) 15 g 0     No current facility-administered medications for this visit.     Current Outpatient Medications on File Prior to Visit   Medication Sig    Continuous Blood Gluc Sensor (FreeStyle Reyna 3 Sensor) MISC Change once every 14 days.    metFORMIN (GLUCOPHAGE-XR) 500 mg 24 hr tablet Take 2 tablets (1,000 mg total) by mouth 2 (two) times a day with meals    omeprazole (PriLOSEC) 40 MG capsule Take 1 capsule (40 mg total) by mouth daily    pravastatin (PRAVACHOL) 40 mg tablet Take 1 tablet (40 mg total) by mouth daily at bedtime    Synthroid 88 MCG tablet Take 1 tablet (88 mcg total) by mouth daily    ketoconazole (NIZORAL) 2 %  "cream Apply topically daily (Patient not taking: Reported on 2/20/2024)    [DISCONTINUED] Accu-Chek FastClix Lancets MISC Check BGs 3-4 times daily. (Patient not taking: Reported on 3/14/2024)    [DISCONTINUED] Blood Glucose Monitoring Suppl (Accu-Chek Bettye Plus) w/Device KIT Check BG 3-4 times daily. (Patient not taking: Reported on 3/14/2024)    [DISCONTINUED] Continuous Blood Gluc  (FreeStyle Reyna 2 [place]) NISHA Apply 1 Application topically every 14 (fourteen) days (Patient not taking: Reported on 3/14/2024)    [DISCONTINUED] glucose blood (Accu-Chek Bettye Plus) test strip USE TO CHECK BLOOD SUGAR THREE TO FOUR TIMES A DAY (Patient not taking: Reported on 3/14/2024)     No current facility-administered medications on file prior to visit.     He is allergic to penicillins..    Review of Systems   Constitutional:  Negative for chills and fever.   HENT:  Negative for congestion, ear pain and sore throat.    Eyes:  Negative for pain.   Respiratory:  Negative for cough and shortness of breath.    Cardiovascular:  Negative for chest pain and leg swelling.   Gastrointestinal:  Negative for abdominal pain, nausea and vomiting.   Endocrine: Negative for polyuria.   Genitourinary:  Negative for difficulty urinating, frequency and urgency.   Musculoskeletal:  Negative for arthralgias and back pain.   Skin:  Negative for rash.   Neurological:  Negative for weakness and headaches.   Psychiatric/Behavioral:  Negative for sleep disturbance. The patient is not nervous/anxious.          Objective:      /80 (BP Location: Left arm, Patient Position: Sitting, Cuff Size: Large)   Pulse 82   Temp 98.6 °F (37 °C) (Temporal)   Ht 5' 9\" (1.753 m)   Wt 73 kg (161 lb)   SpO2 97%   BMI 23.78 kg/m²     Recent Results (from the past 1344 hour(s))   POCT hemoglobin A1c    Collection Time: 02/20/24  3:06 PM   Result Value Ref Range    Hemoglobin A1C 7.6 (A) 6.5        Physical Exam  Constitutional:       Appearance: " Normal appearance.   HENT:      Head: Normocephalic.      Right Ear: Tympanic membrane, ear canal and external ear normal.      Left Ear: Tympanic membrane, ear canal and external ear normal.      Nose: Nose normal. No congestion.      Mouth/Throat:      Mouth: Mucous membranes are moist.      Pharynx: Oropharynx is clear. No oropharyngeal exudate or posterior oropharyngeal erythema.   Eyes:      Extraocular Movements: Extraocular movements intact.      Conjunctiva/sclera: Conjunctivae normal.   Cardiovascular:      Rate and Rhythm: Normal rate and regular rhythm.      Heart sounds: Normal heart sounds. No murmur heard.  Pulmonary:      Effort: Pulmonary effort is normal.      Breath sounds: Normal breath sounds. No wheezing or rales.   Abdominal:      General: Abdomen is flat. There is no distension.      Palpations: Abdomen is soft.      Tenderness: There is no abdominal tenderness.   Musculoskeletal:         General: Normal range of motion.      Cervical back: Normal range of motion and neck supple.      Right lower leg: No edema.      Left lower leg: No edema.   Lymphadenopathy:      Cervical: No cervical adenopathy.   Skin:     General: Skin is warm.   Neurological:      General: No focal deficit present.      Mental Status: He is alert and oriented to person, place, and time.

## 2024-04-01 DIAGNOSIS — E78.2 MIXED HYPERLIPIDEMIA: ICD-10-CM

## 2024-04-01 RX ORDER — PRAVASTATIN SODIUM 40 MG
40 TABLET ORAL
Qty: 90 TABLET | Refills: 1 | Status: SHIPPED | OUTPATIENT
Start: 2024-04-01

## 2024-04-01 NOTE — TELEPHONE ENCOUNTER
Refill Request (requesting 90 day supply plus refills)     Pravastatin 40 mg     Pharmacy: [facility] [place]     LA: 3/14/24  NA: 7/22/24

## 2024-05-22 ENCOUNTER — TELEPHONE (OUTPATIENT)
Age: 59
End: 2024-05-22

## 2024-05-22 NOTE — TELEPHONE ENCOUNTER
Ricarda calling for patient to ask for the Topio 3 code to have readings uploaded to the office.    Please call Ricarda to help set this up. [phone]

## 2024-05-23 ENCOUNTER — TELEPHONE (OUTPATIENT)
Age: 59
End: 2024-05-23

## 2024-05-23 NOTE — TELEPHONE ENCOUNTER
Patient called to inform us that his tiffanie is now  connected to office. He would  like for it to be download. He see Jayla.

## 2024-06-01 ENCOUNTER — APPOINTMENT (OUTPATIENT)
Dept: LAB | Facility: CLINIC | Age: 59
End: 2024-06-01
Payer: COMMERCIAL

## 2024-06-01 DIAGNOSIS — E03.9 ACQUIRED HYPOTHYROIDISM: ICD-10-CM

## 2024-06-01 DIAGNOSIS — E53.8 B12 DEFICIENCY: ICD-10-CM

## 2024-06-01 DIAGNOSIS — E11.65 UNCONTROLLED TYPE 2 DIABETES MELLITUS WITH HYPERGLYCEMIA (HCC): ICD-10-CM

## 2024-06-01 DIAGNOSIS — Z11.59 NEED FOR HEPATITIS C SCREENING TEST: ICD-10-CM

## 2024-06-01 DIAGNOSIS — E78.2 MIXED HYPERLIPIDEMIA: ICD-10-CM

## 2024-06-01 LAB
ALBUMIN SERPL BCP-MCNC: 4.7 G/DL (ref 3.5–5)
ALP SERPL-CCNC: 53 U/L (ref 34–104)
ALT SERPL W P-5'-P-CCNC: 18 U/L (ref 7–52)
ANION GAP SERPL CALCULATED.3IONS-SCNC: 10 MMOL/L (ref 4–13)
AST SERPL W P-5'-P-CCNC: 15 U/L (ref 13–39)
BASOPHILS # BLD AUTO: 0.07 THOUSANDS/ÂΜL (ref 0–0.1)
BASOPHILS NFR BLD AUTO: 1 % (ref 0–1)
BILIRUB SERPL-MCNC: 0.57 MG/DL (ref 0.2–1)
BUN SERPL-MCNC: 18 MG/DL (ref 5–25)
CALCIUM SERPL-MCNC: 9.9 MG/DL (ref 8.4–10.2)
CHLORIDE SERPL-SCNC: 101 MMOL/L (ref 96–108)
CHOLEST SERPL-MCNC: 142 MG/DL
CO2 SERPL-SCNC: 31 MMOL/L (ref 21–32)
CREAT SERPL-MCNC: 0.8 MG/DL (ref 0.6–1.3)
EOSINOPHIL # BLD AUTO: 0.19 THOUSAND/ÂΜL (ref 0–0.61)
EOSINOPHIL NFR BLD AUTO: 2 % (ref 0–6)
ERYTHROCYTE [DISTWIDTH] IN BLOOD BY AUTOMATED COUNT: 11.9 % (ref 11.6–15.1)
EST. AVERAGE GLUCOSE BLD GHB EST-MCNC: 137 MG/DL
GFR SERPL CREATININE-BSD FRML MDRD: 96 ML/MIN/1.73SQ M
GLUCOSE P FAST SERPL-MCNC: 135 MG/DL (ref 65–99)
HBA1C MFR BLD: 6.4 %
HCT VFR BLD AUTO: 47.3 % (ref 36.5–49.3)
HDLC SERPL-MCNC: 38 MG/DL
HGB BLD-MCNC: 16.8 G/DL (ref 12–17)
IMM GRANULOCYTES # BLD AUTO: 0.03 THOUSAND/UL (ref 0–0.2)
IMM GRANULOCYTES NFR BLD AUTO: 0 % (ref 0–2)
LDLC SERPL CALC-MCNC: 76 MG/DL (ref 0–100)
LYMPHOCYTES # BLD AUTO: 1.36 THOUSANDS/ÂΜL (ref 0.6–4.47)
LYMPHOCYTES NFR BLD AUTO: 13 % (ref 14–44)
MCH RBC QN AUTO: 33.1 PG (ref 26.8–34.3)
MCHC RBC AUTO-ENTMCNC: 35.5 G/DL (ref 31.4–37.4)
MCV RBC AUTO: 93 FL (ref 82–98)
MONOCYTES # BLD AUTO: 0.75 THOUSAND/ÂΜL (ref 0.17–1.22)
MONOCYTES NFR BLD AUTO: 7 % (ref 4–12)
NEUTROPHILS # BLD AUTO: 8.39 THOUSANDS/ÂΜL (ref 1.85–7.62)
NEUTS SEG NFR BLD AUTO: 77 % (ref 43–75)
NRBC BLD AUTO-RTO: 0 /100 WBCS
PLATELET # BLD AUTO: 175 THOUSANDS/UL (ref 149–390)
PMV BLD AUTO: 11.2 FL (ref 8.9–12.7)
POTASSIUM SERPL-SCNC: 4.6 MMOL/L (ref 3.5–5.3)
PROT SERPL-MCNC: 7.1 G/DL (ref 6.4–8.4)
RBC # BLD AUTO: 5.08 MILLION/UL (ref 3.88–5.62)
SODIUM SERPL-SCNC: 142 MMOL/L (ref 135–147)
TRIGL SERPL-MCNC: 138 MG/DL
TSH SERPL DL<=0.05 MIU/L-ACNC: 2.8 UIU/ML (ref 0.45–4.5)
VIT B12 SERPL-MCNC: 297 PG/ML (ref 180–914)
WBC # BLD AUTO: 10.79 THOUSAND/UL (ref 4.31–10.16)

## 2024-06-01 PROCEDURE — 85025 COMPLETE CBC W/AUTO DIFF WBC: CPT

## 2024-06-01 PROCEDURE — 86803 HEPATITIS C AB TEST: CPT

## 2024-06-01 PROCEDURE — 83036 HEMOGLOBIN GLYCOSYLATED A1C: CPT

## 2024-06-01 PROCEDURE — 80053 COMPREHEN METABOLIC PANEL: CPT

## 2024-06-01 PROCEDURE — 82607 VITAMIN B-12: CPT

## 2024-06-01 PROCEDURE — 80061 LIPID PANEL: CPT

## 2024-06-01 PROCEDURE — 36415 COLL VENOUS BLD VENIPUNCTURE: CPT

## 2024-06-01 PROCEDURE — 84443 ASSAY THYROID STIM HORMONE: CPT

## 2024-06-02 LAB — HCV AB SER QL: REACTIVE

## 2024-06-24 DIAGNOSIS — E78.2 MIXED HYPERLIPIDEMIA: ICD-10-CM

## 2024-06-24 DIAGNOSIS — E11.65 UNCONTROLLED TYPE 2 DIABETES MELLITUS WITH HYPERGLYCEMIA (HCC): ICD-10-CM

## 2024-06-24 RX ORDER — PRAVASTATIN SODIUM 40 MG
40 TABLET ORAL
Qty: 90 TABLET | Refills: 1 | Status: SHIPPED | OUTPATIENT
Start: 2024-06-24

## 2024-06-25 RX ORDER — BLOOD-GLUCOSE SENSOR
EACH MISCELLANEOUS
Qty: 2 EACH | Refills: 5 | Status: SHIPPED | OUTPATIENT
Start: 2024-06-25

## 2024-06-26 DIAGNOSIS — E11.65 UNCONTROLLED TYPE 2 DIABETES MELLITUS WITH HYPERGLYCEMIA (HCC): Primary | ICD-10-CM

## 2024-07-18 ENCOUNTER — RA CDI HCC (OUTPATIENT)
Dept: OTHER | Facility: HOSPITAL | Age: 59
End: 2024-07-18

## 2024-08-08 DIAGNOSIS — K21.9 GASTROESOPHAGEAL REFLUX DISEASE WITHOUT ESOPHAGITIS: ICD-10-CM

## 2024-08-08 RX ORDER — OMEPRAZOLE 40 MG/1
40 CAPSULE, DELAYED RELEASE ORAL DAILY
Qty: 100 CAPSULE | Refills: 1 | Status: SHIPPED | OUTPATIENT
Start: 2024-08-08

## 2024-08-26 DIAGNOSIS — E03.9 ACQUIRED HYPOTHYROIDISM: ICD-10-CM

## 2024-08-27 RX ORDER — LEVOTHYROXINE SODIUM 88 MCG
88 TABLET ORAL DAILY
Qty: 90 TABLET | Refills: 1 | Status: SHIPPED | OUTPATIENT
Start: 2024-08-27

## 2024-10-04 DIAGNOSIS — E11.65 UNCONTROLLED TYPE 2 DIABETES MELLITUS WITH HYPERGLYCEMIA (HCC): Primary | ICD-10-CM

## 2024-10-04 RX ORDER — BLOOD-GLUCOSE SENSOR
EACH MISCELLANEOUS
Qty: 2 EACH | Refills: 2 | Status: SHIPPED | OUTPATIENT
Start: 2024-10-04

## 2024-10-15 ENCOUNTER — VBI (OUTPATIENT)
Dept: ADMINISTRATIVE | Facility: OTHER | Age: 59
End: 2024-10-15

## 2024-10-15 NOTE — TELEPHONE ENCOUNTER
10/15/24 10:03 AM     Chart reviewed for Diabetic Eye Exam was/were submitted to the patient's insurance.     Rhea Norris MA   [facility]

## 2024-11-05 ENCOUNTER — OFFICE VISIT (OUTPATIENT)
Dept: ENDOCRINOLOGY | Facility: CLINIC | Age: 59
End: 2024-11-05
Payer: COMMERCIAL

## 2024-11-05 VITALS
BODY MASS INDEX: 23.85 KG/M2 | HEIGHT: 69 IN | DIASTOLIC BLOOD PRESSURE: 80 MMHG | HEART RATE: 87 BPM | SYSTOLIC BLOOD PRESSURE: 122 MMHG | OXYGEN SATURATION: 97 % | WEIGHT: 161 LBS

## 2024-11-05 DIAGNOSIS — E11.9 TYPE 2 DIABETES, HBA1C GOAL < 7% (HCC): Primary | ICD-10-CM

## 2024-11-05 DIAGNOSIS — E03.9 ACQUIRED HYPOTHYROIDISM: ICD-10-CM

## 2024-11-05 DIAGNOSIS — E78.2 MIXED HYPERLIPIDEMIA: ICD-10-CM

## 2024-11-05 LAB — SL AMB POCT HEMOGLOBIN AIC: 6.6 (ref ?–6.5)

## 2024-11-05 PROCEDURE — 83036 HEMOGLOBIN GLYCOSYLATED A1C: CPT

## 2024-11-05 PROCEDURE — 95251 CONT GLUC MNTR ANALYSIS I&R: CPT

## 2024-11-05 PROCEDURE — 99214 OFFICE O/P EST MOD 30 MIN: CPT

## 2024-11-05 NOTE — PROGRESS NOTES
Established Patient Progress Note      Chief Complaint   Patient presents with    Diabetes Type 2        Impression & Plan:    Problem List Items Addressed This Visit          Endocrine    Acquired hypothyroidism     Thyroid function within target range. Continue current dose of Synthroid.          Type 2 diabetes, HbA1c goal < 7% ([facility]) - Primary     HGA1C at goal. BGs well controlled, in target range 82% of the time. Occasional highs due to diet excursions.   Treatment regimen:   - He is tolerating Jardiance without any issues. Continue at current dose.   - Did not tolerate Metformin due to GI disturbance.   - Continue with CGM to monitor BGs.   Discussed risks/complications associated with uncontrolled diabetes.    Advised to adhere to diabetic diet, and recommended staying active/exercising routinely.  Keep carbohydrates consistent to limit blood glucose fluctuations.  Advised to call if blood sugars less than 70 mg/dl or over 300 mg/dl.   Discussed symptoms and treatment of hypoglycemia.    Recommended routine follow-up with podiatry and ophthalmology.   Ordered blood work to complete prior to next visit.   Lab Results   Component Value Date    HGBA1C 6.6 (A) 11/05/2024            Relevant Orders    POCT hemoglobin A1c (Completed)    Albumin / creatinine urine ratio    Hemoglobin A1C    Comprehensive metabolic panel    Lipid panel       Other    Mixed hyperlipidemia     Continue statin, check lipid panel prior to next follow up.             History of Present Illness:   Pantera Jimenez is a 59 y.o. male with type 2 diabetes seen in follow up. Reports complications of none. Denies recent severe hypoglycemic or severe hyperglycemic episodes. Denies any issues with his current regimen. POCT A1C was 6.6. Home glucose monitoring: are performed regularly with CGM.     GI disturbance with Metformin     Pantera Jimenez   Device used: Reyna 3 plus   Home use   Indication: Type 2 Diabetes  More than 72 hours of data  was reviewed. Report to be scanned to chart.   Date Range: 10/23/24-24  Analysis of data:   Average Glucose: 153  Coefficient of Variation: 20.6%    Time in Target Range: 82%   Time Above Range: 18% high    Time Below Range: 0%   Interpretation of data:  BGs overall well controlled.      Current regimen:   Jardiance 25 mg daily      Last Eye Exam: UTD  Last Foot Exam: UTD     Has hyperlipidemia: Taking pravastatin         Thyroid disorders: Taking Synthroid 88 mcg daily    Patient Active Problem List   Diagnosis    Throat cancer (HCC)    Impaired fasting glucose    Hypertriglyceridemia    GERD (gastroesophageal reflux disease)    Acquired hypothyroidism    Mixed hyperlipidemia    Tobacco use    Plantar fasciitis, right    COVID-19 virus detected    Type 2 diabetes, HbA1c goal < 7% (HCC)    Uncontrolled type 2 diabetes mellitus with hyperglycemia (HCC)    B12 deficiency      Past Medical History:   Diagnosis Date    GERD (gastroesophageal reflux disease)     Hepatitis C     Hypertriglyceridemia     Impaired fasting glucose     Throat cancer (HCC)       Past Surgical History:   Procedure Laterality Date    KNEE SURGERY      MULTIPLE TOOTH EXTRACTIONS      PEG TUBE PLACEMENT      PEG TUBE REMOVAL      REPLACEMENT TOTAL KNEE Right     TRACHEOSTOMY        Social History     Tobacco Use    Smoking status: Former     Current packs/day: 0.00     Average packs/day: 1 pack/day for 35.7 years (35.7 ttl pk-yrs)     Types: Cigarettes     Start date: 1975     Quit date: 2011     Years since quittin.7     Passive exposure: Never    Smokeless tobacco: Never    Tobacco comments:     Quit 12 years ago   Substance Use Topics    Alcohol use: Yes     Alcohol/week: 1.0 standard drink of alcohol     Types: 1 Glasses of wine per week     Comment: Rare. Not every week     Allergies   Allergen Reactions    Penicillins          Current Outpatient Medications:     Continuous Glucose Sensor (FreeStyle Reyna 3 Plus Sensor)  "MISC, Change once every 15 days, Disp: 2 each, Rfl: 2    Empagliflozin (Jardiance) 25 MG TABS, Take 1 tablet (25 mg total) by mouth every morning, Disp: 90 tablet, Rfl: 1    omeprazole (PriLOSEC) 40 MG capsule, TAKE 1 CAPSULE DAILY, Disp: 100 capsule, Rfl: 1    pravastatin (PRAVACHOL) 40 mg tablet, Take 1 tablet (40 mg total) by mouth daily at bedtime, Disp: 90 tablet, Rfl: 1    Synthroid 88 MCG tablet, Take 1 tablet (88 mcg total) by mouth daily, Disp: 90 tablet, Rfl: 1    ketoconazole (NIZORAL) 2 % cream, Apply topically daily (Patient not taking: Reported on 2/20/2024), Disp: 15 g, Rfl: 0    Review of Systems   Constitutional:  Negative for activity change, appetite change, chills, diaphoresis, fatigue, fever and unexpected weight change.   Eyes:  Negative for visual disturbance.   Respiratory:  Negative for chest tightness and shortness of breath.    Cardiovascular:  Negative for chest pain, palpitations and leg swelling.   Gastrointestinal:  Negative for abdominal pain, constipation, diarrhea, nausea and vomiting.   Endocrine: Negative for polydipsia, polyphagia and polyuria.   Skin:  Negative for color change, pallor, rash and wound.   Neurological:  Negative for dizziness, tremors, weakness, light-headedness and numbness.   All other systems reviewed and are negative.      Physical Exam:  Body mass index is 23.78 kg/m².  /80   Pulse 87   Ht 5' 9\" (1.753 m)   Wt 73 kg (161 lb)   SpO2 97%   BMI 23.78 kg/m²    Wt Readings from Last 3 Encounters:   11/05/24 73 kg (161 lb)   03/14/24 73 kg (161 lb)   02/20/24 74.1 kg (163 lb 6.4 oz)       Physical Exam  Vitals reviewed.   Constitutional:       Appearance: Normal appearance. He is normal weight.   Cardiovascular:      Rate and Rhythm: Normal rate.   Pulmonary:      Effort: Pulmonary effort is normal.   Neurological:      Mental Status: He is alert and oriented to person, place, and time.   Psychiatric:         Mood and Affect: Mood normal.         " Thought Content: Thought content normal.         Labs:   Lab Results   Component Value Date    HGBA1C 6.6 (A) 11/05/2024    HGBA1C 6.4 (H) 06/01/2024    HGBA1C 7.6 (A) 02/20/2024     Lab Results   Component Value Date    CREATININE 0.80 06/01/2024    CREATININE 0.96 11/04/2023    CREATININE 0.81 11/23/2022    BUN 18 06/01/2024     08/01/2015    K 4.6 06/01/2024     06/01/2024    CO2 31 06/01/2024     eGFR   Date Value Ref Range Status   06/01/2024 96 ml/min/1.73sq m Final     Lab Results   Component Value Date    CHOL 175 05/17/2014    HDL 38 (L) 06/01/2024    TRIG 138 06/01/2024     Lab Results   Component Value Date    ALT 18 06/01/2024    AST 15 06/01/2024    ALKPHOS 53 06/01/2024    BILITOT 0.69 08/01/2015     Lab Results   Component Value Date    BQT2SDOUUPGC 2.796 06/01/2024    GCS8FKAILVES 4.410 11/04/2023    ZET5FLJMBSPN 4.760 (H) 11/23/2022       There are no Patient Instructions on file for this visit.    Discussed with the patient and all questioned fully answered. He will call me if any problems arise.    TERRANCE Hewitt

## 2024-11-05 NOTE — ASSESSMENT & PLAN NOTE
HGA1C at goal. BGs well controlled, in target range 82% of the time. Occasional highs due to diet excursions.   Treatment regimen:   - He is tolerating Jardiance without any issues. Continue at current dose.   - Did not tolerate Metformin due to GI disturbance.   - Continue with CGM to monitor BGs.   Discussed risks/complications associated with uncontrolled diabetes.    Advised to adhere to diabetic diet, and recommended staying active/exercising routinely.  Keep carbohydrates consistent to limit blood glucose fluctuations.  Advised to call if blood sugars less than 70 mg/dl or over 300 mg/dl.   Discussed symptoms and treatment of hypoglycemia.    Recommended routine follow-up with podiatry and ophthalmology.   Ordered blood work to complete prior to next visit.   Lab Results   Component Value Date    HGBA1C 6.6 (A) 11/05/2024

## 2024-11-24 DIAGNOSIS — E11.65 UNCONTROLLED TYPE 2 DIABETES MELLITUS WITH HYPERGLYCEMIA (HCC): ICD-10-CM

## 2024-11-26 RX ORDER — EMPAGLIFLOZIN 25 MG/1
25 TABLET, FILM COATED ORAL EVERY MORNING
Qty: 90 TABLET | Refills: 1 | Status: SHIPPED | OUTPATIENT
Start: 2024-11-26

## 2024-12-30 DIAGNOSIS — E78.2 MIXED HYPERLIPIDEMIA: ICD-10-CM

## 2024-12-31 RX ORDER — PRAVASTATIN SODIUM 40 MG
40 TABLET ORAL
Qty: 90 TABLET | Refills: 1 | Status: SHIPPED | OUTPATIENT
Start: 2024-12-31

## 2025-01-15 DIAGNOSIS — E11.65 UNCONTROLLED TYPE 2 DIABETES MELLITUS WITH HYPERGLYCEMIA (HCC): ICD-10-CM

## 2025-01-15 RX ORDER — HYDROCHLOROTHIAZIDE 12.5 MG/1
CAPSULE ORAL
Qty: 2 EACH | Refills: 5 | Status: SHIPPED | OUTPATIENT
Start: 2025-01-15

## 2025-01-17 ENCOUNTER — TELEPHONE (OUTPATIENT)
Age: 60
End: 2025-01-17

## 2025-01-17 NOTE — TELEPHONE ENCOUNTER
PA for ChangeMobe 3 Plus Sensor  APPROVED     Date(s) approved 1/17/25-1/17/26    Case #    Patient advised by          []Biofisicahart Message  [x]Phone call   []LMOM  []L/M to call office as no active Communication consent on file  []Unable to leave detailed message as VM not approved on Communication consent       Pharmacy advised by    [x]Fax  []Phone call    Approval letter scanned into Media No

## 2025-01-17 NOTE — TELEPHONE ENCOUNTER
PA for FreeStyle Reyna 3 Plus Sensor SUBMITTED to \[facility]\""    via  [x][address]-Case ID # [id number]      []PA sent as URGENT    All office notes, labs and other pertaining documents and studies sent. Clinical questions answered. Awaiting determination from insurance company.     Turnaround time for your insurance to make a decision on your Prior Authorization can take 7-21 business days.

## 2025-02-06 DIAGNOSIS — K21.9 GASTROESOPHAGEAL REFLUX DISEASE WITHOUT ESOPHAGITIS: ICD-10-CM

## 2025-02-06 RX ORDER — OMEPRAZOLE 40 MG/1
40 CAPSULE, DELAYED RELEASE ORAL DAILY
Qty: 90 CAPSULE | Refills: 1 | Status: SHIPPED | OUTPATIENT
Start: 2025-02-06

## 2025-02-20 ENCOUNTER — RA CDI HCC (OUTPATIENT)
Dept: OTHER | Facility: HOSPITAL | Age: 60
End: 2025-02-20

## 2025-02-20 NOTE — PROGRESS NOTES
HCC coding opportunities       Chart reviewed, no opportunity found: CHART REVIEWED, NO OPPORTUNITY FOUND        Patients Insurance        Commercial Insurance: [employer]       This is a reminder to assess ((resolve/update/assess)  all HCC (risk adjustment) codes for the year 2025 as patients TORIN scores reset to zero with the New year.    Also, just a reminder to please review and assess all other chronic conditions for 2025.

## 2025-02-23 DIAGNOSIS — E03.9 ACQUIRED HYPOTHYROIDISM: ICD-10-CM

## 2025-02-24 RX ORDER — LEVOTHYROXINE SODIUM 88 MCG
88 TABLET ORAL DAILY
Qty: 90 TABLET | Refills: 1 | Status: SHIPPED | OUTPATIENT
Start: 2025-02-24 | End: 2025-02-26 | Stop reason: SDUPTHER

## 2025-02-26 ENCOUNTER — OFFICE VISIT (OUTPATIENT)
Dept: INTERNAL MEDICINE CLINIC | Facility: CLINIC | Age: 60
End: 2025-02-26
Payer: COMMERCIAL

## 2025-02-26 VITALS
HEART RATE: 68 BPM | BODY MASS INDEX: 24.88 KG/M2 | WEIGHT: 168 LBS | HEIGHT: 69 IN | OXYGEN SATURATION: 98 % | TEMPERATURE: 98.2 F | DIASTOLIC BLOOD PRESSURE: 72 MMHG | SYSTOLIC BLOOD PRESSURE: 112 MMHG

## 2025-02-26 DIAGNOSIS — E11.9 DIABETIC EYE EXAM (HCC): ICD-10-CM

## 2025-02-26 DIAGNOSIS — Z01.00 DIABETIC EYE EXAM (HCC): ICD-10-CM

## 2025-02-26 DIAGNOSIS — E03.9 ACQUIRED HYPOTHYROIDISM: ICD-10-CM

## 2025-02-26 DIAGNOSIS — Z00.00 ANNUAL PHYSICAL EXAM: Primary | ICD-10-CM

## 2025-02-26 DIAGNOSIS — C14.0 THROAT CANCER (HCC): ICD-10-CM

## 2025-02-26 PROCEDURE — 99396 PREV VISIT EST AGE 40-64: CPT | Performed by: INTERNAL MEDICINE

## 2025-02-26 RX ORDER — LEVOTHYROXINE SODIUM 88 MCG
88 TABLET ORAL DAILY
Qty: 90 TABLET | Refills: 1 | Status: SHIPPED | OUTPATIENT
Start: 2025-02-26

## 2025-02-26 NOTE — PROGRESS NOTES
Adult Annual Physical  Name: Pantera Jimenez      : 1965      MRN: [id number]  Encounter Provider: Chino Gutierrez MD  Encounter Date: 2025   Encounter department: [facility] INTERNAL MEDICINE    Assessment & Plan  Diabetic eye exam (HCC)         Acquired hypothyroidism         Mixed hyperlipidemia         Uncontrolled type 2 diabetes mellitus with hyperglycemia (HCC)    Lab Results   Component Value Date    HGBA1C 6.6 (A) 2024          Annual physical exam           Immunizations and preventive care screenings were discussed with patient today. Appropriate education was printed on patient's after visit summary.    Discussed risks and benefits of prostate cancer screening. We discussed the controversial history of PSA screening for prostate cancer in the [place] as well as the risk of over detection and over treatment of prostate cancer by way of PSA screening.  The patient understands that PSA blood testing is an imperfect way to screen for prostate cancer and that elevated PSA levels in the blood may also be caused by infection, inflammation, prostatic trauma or manipulation, urological procedures, or by benign prostatic enlargement.    The role of the digital rectal examination in prostate cancer screening was also discussed and I discussed with him that there is large interobserver variability in the findings of digital rectal examination.    Counseling:  Exercise: the importance of regular exercise/physical activity was discussed. Recommend exercise 3-5 times per week for at least 30 minutes.          History of Present Illness     Adult Annual Physical:  Patient presents for annual physical. physical.     Diet and Physical Activity:  - Diet/Nutrition: diabetic diet.  - Exercise: moderate cardiovascular exercise.    Depression Screening:  - PHQ-2 Score: 0    General Health:  - Sleep: sleeps well.  - Hearing: normal hearing bilateral ears.  - Vision: no vision problems.  -  Dental: regular dental visits.     Health:  - History of STDs: no.   - Urinary symptoms: none.     Advanced Care Planning:  - Has an advanced directive?: yes    - Has a durable medical POA?: yes    - ACP document given to patient?: no      Review of Systems   Constitutional:  Negative for chills and fever.   HENT:  Negative for congestion, ear pain and sore throat.    Eyes:  Negative for pain.   Respiratory:  Negative for cough and shortness of breath.    Cardiovascular:  Negative for chest pain and leg swelling.   Gastrointestinal:  Negative for abdominal pain, nausea and vomiting.   Endocrine: Negative for polyuria.   Genitourinary:  Negative for difficulty urinating, frequency and urgency.   Musculoskeletal:  Negative for arthralgias and back pain.   Skin:  Negative for rash.   Neurological:  Negative for weakness and headaches.   Psychiatric/Behavioral:  Negative for sleep disturbance. The patient is not nervous/anxious.      Current Outpatient Medications on File Prior to Visit   Medication Sig Dispense Refill    Continuous Glucose Sensor (FreeStyle Reyna 3 Plus Sensor) MISC apply 1 SENSOR ONTO THE SKIN ONCE every 15 DAYS 2 each 5    Jardiance 25 MG TABS take 1 tablet by mouth every morning 90 tablet 1    omeprazole (PriLOSEC) 40 MG capsule TAKE 1 CAPSULE DAILY 90 capsule 1    pravastatin (PRAVACHOL) 40 mg tablet take 1 tablet by mouth daily at bedtime 90 tablet 1    [DISCONTINUED] Synthroid 88 MCG tablet take 1 tablet by mouth once daily 90 tablet 1    ketoconazole (NIZORAL) 2 % cream Apply topically daily (Patient not taking: Reported on 2024) 15 g 0     No current facility-administered medications on file prior to visit.      Social History     Tobacco Use    Smoking status: Former     Current packs/day: 0.00     Average packs/day: 1 pack/day for 35.7 years (35.7 ttl pk-yrs)     Types: Cigarettes     Start date: 1975     Quit date: 2011     Years since quittin.0     Passive exposure:  "Never    Smokeless tobacco: Never    Tobacco comments:     Quit 12 years ago   Vaping Use    Vaping status: Never Used   Substance and Sexual Activity    Alcohol use: Yes     Alcohol/week: 1.0 standard drink of alcohol     Types: 1 Glasses of wine per week     Comment: Rare. Not every week    Drug use: Never    Sexual activity: Yes     Partners: Female     Birth control/protection: Post-menopausal       Objective   /72 (BP Location: Left arm, Patient Position: Sitting, Cuff Size: Standard)   Pulse 68   Temp 98.2 °F (36.8 °C) (Temporal)   Ht 5' 9\" (1.753 m)   Wt 76.2 kg (168 lb)   SpO2 98%   BMI 24.81 kg/m²     Physical Exam  Vitals and nursing note reviewed.   Constitutional:       General: He is not in acute distress.     Appearance: He is well-developed.   HENT:      Head: Normocephalic and atraumatic.      Right Ear: Tympanic membrane, ear canal and external ear normal.      Left Ear: Tympanic membrane, ear canal and external ear normal.      Mouth/Throat:      Mouth: Mucous membranes are moist.      Pharynx: Oropharynx is clear.   Eyes:      Extraocular Movements: Extraocular movements intact.      Conjunctiva/sclera: Conjunctivae normal.   Cardiovascular:      Rate and Rhythm: Normal rate and regular rhythm.      Heart sounds: Normal heart sounds. No murmur heard.  Pulmonary:      Effort: Pulmonary effort is normal. No respiratory distress.      Breath sounds: Normal breath sounds. No wheezing or rales.   Abdominal:      General: Abdomen is flat. There is no distension.      Palpations: Abdomen is soft.      Tenderness: There is no abdominal tenderness.   Musculoskeletal:         General: No swelling.      Cervical back: Neck supple.      Right lower leg: No edema.      Left lower leg: No edema.   Skin:     General: Skin is warm and dry.      Capillary Refill: Capillary refill takes less than 2 seconds.   Neurological:      General: No focal deficit present.      Mental Status: He is alert and " oriented to person, place, and time.   Psychiatric:         Mood and Affect: Mood normal.

## 2025-02-26 NOTE — PATIENT INSTRUCTIONS
"Patient Education     Routine physical for adults   The Basics   Written by the doctors and editors at [facility]   What is a physical? -- A physical is a routine visit, or \"check-up,\" with your doctor. You might also hear it called a \"wellness visit\" or \"preventive visit.\"  During each visit, the doctor will:   Ask about your physical and mental health   Ask about your habits, behaviors, and lifestyle   Do an exam   Give you vaccines if needed   Talk to you about any medicines you take   Give advice about your health   Answer your questions  Getting regular check-ups is an important part of taking care of your health. It can help your doctor find and treat any problems you have. But it's also important for preventing health problems.  A routine physical is different from a \"sick visit.\" A sick visit is when you see a doctor because of a health concern or problem. Since physicals are scheduled ahead of time, you can think about what you want to ask the doctor.  How often should I get a physical? -- It depends on your age and health. In general, for people age 21 years and older:   If you are younger than 50 years, you might be able to get a physical every 3 years.   If you are 50 years or older, your doctor might recommend a physical every year.  If you have an ongoing health condition, like diabetes or high blood pressure, your doctor will probably want to see you more often.  What happens during a physical? -- In general, each visit will include:   Physical exam - The doctor or nurse will check your height, weight, heart rate, and blood pressure. They will also look at your eyes and ears. They will ask about how you are feeling and whether you have any symptoms that bother you.   Medicines - It's a good idea to bring a list of all the medicines you take to each doctor visit. Your doctor will talk to you about your medicines and answer any questions. Tell them if you are having any side effects that bother you. You " "should also tell them if you are having trouble paying for any of your medicines.   Habits and behaviors - This includes:   Your diet   Your exercise habits   Whether you smoke, drink alcohol, or use drugs   Whether you are sexually active   Whether you feel safe at home  Your doctor will talk to you about things you can do to improve your health and lower your risk of health problems. They will also offer help and support. For example, if you want to quit smoking, they can give you advice and might prescribe medicines. If you want to improve your diet or get more physical activity, they can help you with this, too.   Lab tests, if needed - The tests you get will depend on your age and situation. For example, your doctor might want to check your:   Cholesterol   Blood sugar   Iron level   Vaccines - The recommended vaccines will depend on your age, health, and what vaccines you already had. Vaccines are very important because they can prevent certain serious or deadly infections.   Discussion of screening - \"Screening\" means checking for diseases or other health problems before they cause symptoms. Your doctor can recommend screening based on your age, risk, and preferences. This might include tests to check for:   Cancer, such as breast, prostate, cervical, ovarian, colorectal, prostate, lung, or skin cancer   Sexually transmitted infections, such as chlamydia and gonorrhea   Mental health conditions like depression and anxiety  Your doctor will talk to you about the different types of screening tests. They can help you decide which screenings to have. They can also explain what the results might mean.   Answering questions - The physical is a good time to ask the doctor or nurse questions about your health. If needed, they can refer you to other doctors or specialists, too.  Adults older than 65 years often need other care, too. As you get older, your doctor will talk to you about:   How to prevent falling at " home   Hearing or vision tests   Memory testing   How to take your medicines safely   Making sure that you have the help and support you need at home  All topics are updated as new evidence becomes available and our peer review process is complete.  This topic retrieved from [employer] on: May 02, 2024.  Topic 788352 Version 1.0  Release: 32.4.3 - C32.122  © 2024 UpToDate, Inc. and/or its affiliates. All rights reserved.  Consumer Information Use and Disclaimer   Disclaimer: This generalized information is a limited summary of diagnosis, treatment, and/or medication information. It is not meant to be comprehensive and should be used as a tool to help the user understand and/or assess potential diagnostic and treatment options. It does NOT include all information about conditions, treatments, medications, side effects, or risks that may apply to a specific patient. It is not intended to be medical advice or a substitute for the medical advice, diagnosis, or treatment of a health care provider based on the health care provider's examination and assessment of a patient's specific and unique circumstances. Patients must speak with a health care provider for complete information about their health, medical questions, and treatment options, including any risks or benefits regarding use of medications. This information does not endorse any treatments or medications as safe, effective, or approved for treating a specific patient. UpToDate, Inc. and its affiliates disclaim any warranty or liability relating to this information or the use thereof.The use of this information is governed by the Terms of Use, available at https://www.[employer]uwer.com/en/know/clinical-effectiveness-terms. 2024© UpToDate, Inc. and its affiliates and/or licensors. All rights reserved.  Copyright   © 2024 UpToDate, Inc. and/or its affiliates. All rights reserved.

## 2025-06-10 ENCOUNTER — VBI (OUTPATIENT)
Dept: ADMINISTRATIVE | Facility: OTHER | Age: 60
End: 2025-06-10

## 2025-06-10 NOTE — TELEPHONE ENCOUNTER
06/10/25 8:40 AM     Chart reviewed for   Comprehensive Diabetes Care - Eye Exam    ; nothing is submitted to the patient's insurance at this time.     ROGELIO ARAUJO MA   [facility]

## 2025-06-26 DIAGNOSIS — E78.2 MIXED HYPERLIPIDEMIA: ICD-10-CM

## 2025-06-27 RX ORDER — PRAVASTATIN SODIUM 40 MG
40 TABLET ORAL
Qty: 30 TABLET | Refills: 0 | Status: SHIPPED | OUTPATIENT
Start: 2025-06-27

## 2025-06-30 DIAGNOSIS — E11.65 UNCONTROLLED TYPE 2 DIABETES MELLITUS WITH HYPERGLYCEMIA (HCC): ICD-10-CM

## 2025-06-30 NOTE — TELEPHONE ENCOUNTER
Reason for call:   [x] Refill   [] Prior Auth  [] Other:     Office:   [] PCP/Provider -   [x] Specialty/Provider - Endo     Medication: Jardiance     Dose/Frequency: 25 mg tablet taken by mouth once daily     Quantity: 90    Pharmacy: [address] [facility]  [address] [place] [address]. [phone]     Local Pharmacy   Does the patient have enough for 3 days?   [] Yes   [x] No - Send as HP to POD    Mail Away Pharmacy   Does the patient have enough for 10 days?   [] Yes   [] No - Send as HP to POD

## 2025-07-07 DIAGNOSIS — E11.65 UNCONTROLLED TYPE 2 DIABETES MELLITUS WITH HYPERGLYCEMIA (HCC): ICD-10-CM

## 2025-07-08 RX ORDER — HYDROCHLOROTHIAZIDE 12.5 MG/1
CAPSULE ORAL
Qty: 2 EACH | Refills: 5 | Status: SHIPPED | OUTPATIENT
Start: 2025-07-08

## 2025-08-01 DIAGNOSIS — E78.2 MIXED HYPERLIPIDEMIA: ICD-10-CM

## 2025-08-01 DIAGNOSIS — E03.9 ACQUIRED HYPOTHYROIDISM: ICD-10-CM

## 2025-08-01 DIAGNOSIS — K21.9 GASTROESOPHAGEAL REFLUX DISEASE WITHOUT ESOPHAGITIS: ICD-10-CM

## 2025-08-01 RX ORDER — PRAVASTATIN SODIUM 40 MG
40 TABLET ORAL
Qty: 90 TABLET | Refills: 1 | Status: SHIPPED | OUTPATIENT
Start: 2025-08-01

## 2025-08-01 RX ORDER — LEVOTHYROXINE SODIUM 88 MCG
88 TABLET ORAL DAILY
Qty: 30 TABLET | Refills: 0 | Status: SHIPPED | OUTPATIENT
Start: 2025-08-01

## 2025-08-01 RX ORDER — OMEPRAZOLE 40 MG/1
40 CAPSULE, DELAYED RELEASE ORAL DAILY
Qty: 90 CAPSULE | Refills: 1 | Status: SHIPPED | OUTPATIENT
Start: 2025-08-01

## 2025-08-04 ENCOUNTER — APPOINTMENT (OUTPATIENT)
Dept: LAB | Facility: CLINIC | Age: 60
End: 2025-08-04
Payer: COMMERCIAL

## 2025-08-04 DIAGNOSIS — E11.9 TYPE 2 DIABETES, HBA1C GOAL < 7% (HCC): ICD-10-CM

## 2025-08-04 LAB
ALBUMIN SERPL BCG-MCNC: 4.6 G/DL (ref 3.5–5)
ALP SERPL-CCNC: 83 U/L (ref 34–104)
ALT SERPL W P-5'-P-CCNC: 20 U/L (ref 7–52)
ANION GAP SERPL CALCULATED.3IONS-SCNC: 8 MMOL/L (ref 4–13)
AST SERPL W P-5'-P-CCNC: 21 U/L (ref 13–39)
BILIRUB SERPL-MCNC: 1.13 MG/DL (ref 0.2–1)
BUN SERPL-MCNC: 19 MG/DL (ref 5–25)
CALCIUM SERPL-MCNC: 9.7 MG/DL (ref 8.4–10.2)
CHLORIDE SERPL-SCNC: 104 MMOL/L (ref 96–108)
CHOLEST SERPL-MCNC: 145 MG/DL (ref ?–200)
CO2 SERPL-SCNC: 29 MMOL/L (ref 21–32)
CREAT SERPL-MCNC: 0.8 MG/DL (ref 0.6–1.3)
EST. AVERAGE GLUCOSE BLD GHB EST-MCNC: 151 MG/DL
GFR SERPL CREATININE-BSD FRML MDRD: 95 ML/MIN/1.73SQ M
GLUCOSE P FAST SERPL-MCNC: 115 MG/DL (ref 65–99)
HBA1C MFR BLD: 6.9 %
HDLC SERPL-MCNC: 39 MG/DL
LDLC SERPL CALC-MCNC: 82 MG/DL (ref 0–100)
NONHDLC SERPL-MCNC: 106 MG/DL
POTASSIUM SERPL-SCNC: 4.4 MMOL/L (ref 3.5–5.3)
PROT SERPL-MCNC: 7 G/DL (ref 6.4–8.4)
SODIUM SERPL-SCNC: 141 MMOL/L (ref 135–147)
TRIGL SERPL-MCNC: 118 MG/DL (ref ?–150)

## 2025-08-04 PROCEDURE — 80061 LIPID PANEL: CPT

## 2025-08-04 PROCEDURE — 83036 HEMOGLOBIN GLYCOSYLATED A1C: CPT

## 2025-08-04 PROCEDURE — 80053 COMPREHEN METABOLIC PANEL: CPT

## 2025-08-04 PROCEDURE — 36415 COLL VENOUS BLD VENIPUNCTURE: CPT

## 2025-08-07 ENCOUNTER — DOCUMENTATION (OUTPATIENT)
Dept: ENDOCRINOLOGY | Facility: CLINIC | Age: 60
End: 2025-08-07

## 2025-08-19 ENCOUNTER — OFFICE VISIT (OUTPATIENT)
Dept: ENDOCRINOLOGY | Facility: CLINIC | Age: 60
End: 2025-08-19
Payer: COMMERCIAL

## 2025-08-19 VITALS
WEIGHT: 167 LBS | SYSTOLIC BLOOD PRESSURE: 118 MMHG | HEIGHT: 69 IN | DIASTOLIC BLOOD PRESSURE: 78 MMHG | BODY MASS INDEX: 24.73 KG/M2 | HEART RATE: 70 BPM | OXYGEN SATURATION: 98 %

## 2025-08-19 DIAGNOSIS — E03.9 ACQUIRED HYPOTHYROIDISM: ICD-10-CM

## 2025-08-19 DIAGNOSIS — E11.9 TYPE 2 DIABETES, HBA1C GOAL < 7% (HCC): Primary | ICD-10-CM

## 2025-08-19 DIAGNOSIS — E11.65 UNCONTROLLED TYPE 2 DIABETES MELLITUS WITH HYPERGLYCEMIA (HCC): ICD-10-CM

## 2025-08-19 DIAGNOSIS — E78.2 MIXED HYPERLIPIDEMIA: ICD-10-CM

## 2025-08-19 PROCEDURE — 99214 OFFICE O/P EST MOD 30 MIN: CPT | Performed by: PHYSICIAN ASSISTANT

## 2025-08-19 PROCEDURE — 95251 CONT GLUC MNTR ANALYSIS I&R: CPT | Performed by: PHYSICIAN ASSISTANT

## 2025-08-19 RX ORDER — LEVOTHYROXINE SODIUM 88 MCG
88 TABLET ORAL DAILY
Qty: 90 TABLET | Refills: 2 | Status: SHIPPED | OUTPATIENT
Start: 2025-08-19